# Patient Record
Sex: MALE | Race: AMERICAN INDIAN OR ALASKA NATIVE | NOT HISPANIC OR LATINO | Employment: UNEMPLOYED | URBAN - METROPOLITAN AREA
[De-identification: names, ages, dates, MRNs, and addresses within clinical notes are randomized per-mention and may not be internally consistent; named-entity substitution may affect disease eponyms.]

---

## 2017-01-09 ENCOUNTER — TELEPHONE (OUTPATIENT)
Dept: FAMILY MEDICINE | Facility: CLINIC | Age: 37
End: 2017-01-09

## 2017-01-09 NOTE — TELEPHONE ENCOUNTER
Prior Authorization needed on:  Patient would like ASAP.  Patient states he has been waiting for a week.    Medication:  gabapentin (NEURONTIN) 800 MG tablet  Dose:  Sig: Take 1 tablet (800 mg) by mouth 3 times daily    Pharmacy confirmed as   [id number] [place], [place] - [address] [place] [id number]  Phone: [phone] Fax: [phone]  : Yes    Insurance Name:  Patient states insurance is changing.  Currently [employer].  Insurance Phone: Unknown  Insurance Patient ID: Current Insurance ID: [id number].    Alternatives Suggested:  N/A    Leatha Proctor January 9, 2017 at 2:27 PM

## 2017-01-14 NOTE — TELEPHONE ENCOUNTER
Prior Authorization Retail Medication Request    Insurance ID (if provided): [id number]  Insurance Phone (if provided):     Any additional info from fax request:   BIN  [id number]  ELIDA  [facility]  [id number]

## 2017-01-17 ENCOUNTER — OFFICE VISIT (OUTPATIENT)
Dept: FAMILY MEDICINE | Facility: CLINIC | Age: 37
End: 2017-01-17

## 2017-01-17 VITALS
OXYGEN SATURATION: 97 % | DIASTOLIC BLOOD PRESSURE: 88 MMHG | HEIGHT: 71 IN | HEART RATE: 114 BPM | RESPIRATION RATE: 18 BRPM | BODY MASS INDEX: 44.1 KG/M2 | TEMPERATURE: 98 F | SYSTOLIC BLOOD PRESSURE: 134 MMHG | WEIGHT: 315 LBS

## 2017-01-17 DIAGNOSIS — M22.2X2 PATELLOFEMORAL STRESS SYNDROME OF BOTH KNEES: Primary | ICD-10-CM

## 2017-01-17 DIAGNOSIS — M22.2X1 PATELLOFEMORAL STRESS SYNDROME OF BOTH KNEES: Primary | ICD-10-CM

## 2017-01-17 ASSESSMENT — ENCOUNTER SYMPTOMS
FEVER: 0
ARTHRALGIAS: 1
SHORTNESS OF BREATH: 0
BACK PAIN: 1
HEADACHES: 0
ABDOMINAL PAIN: 0
VOMITING: 0
NAUSEA: 0
DYSURIA: 0

## 2017-01-17 NOTE — PATIENT INSTRUCTIONS
Here is the plan from today's visit    1. Patellofemoral stress syndrome of both knees  May use naproxen daily for pain relief  May use ice/heat for pain relief  Please do the exercises provided.  If no relief in 4-6 weeks, return to clinic.       Exercises for Patellofemoral Syndrome  Ice your knee before and after the exercises  1. The Clam Shell  Hold it open for 10 seconds,  repeat 10 times in the AM and 10 in the PM      2 The VMO Exercise  Sit on a hard surface, grab your less painful leg, sit up straight and raise the other leg off the floor just a few inches-don't lean back. Hold it up for 10 sec then relax  -repeat 10 times in the AM and 10 times in the PM       3 The Plank  Hold your body straight as a board for 10 sec then -repeat 10 times in the AM and 10 the  PM            Please call or return to clinic if your symptoms don't go away.    Follow up plan  Please make a clinic appointment for follow up with your primary physician Perico Weinstein in 4-6 weeks  for bilateral knee pain.    Thank you for coming to [facility] today.  Lab Testing:  **If you had lab testing today and your results are reassuring or normal they will be mailed to you or sent through [email or web address] within 7 days.   **If the lab tests need quick action we will call you with the results.  The phone number we will call with results is # [phone] (home) . If this is not the best number please call our clinic and change the number.  Medication Refills:  If you need any refills please call your pharmacy and they will contact us.   If you need to  your refill at a new pharmacy, please contact the new pharmacy directly. The new pharmacy will help you get your medications transferred faster.   Scheduling:  If you have any concerns about today's visit or wish to schedule another appointment please call our office during normal business hours [phone] (8-5:00 M-F)  If a referral was made to a [facility] and  you don't get a call from central scheduling please call [phone].  If a Mammogram was ordered for you at [facility] call [phone] to schedule or change your appointment.  If you had an XRay/CT/Ultrasound/MRI ordered the number is [phone] to schedule or change your radiology appointment.   Medical Concerns:  If you have urgent medical concerns please call [phone] at any time of the day.  If you have a medical emergency please call 444.

## 2017-01-17 NOTE — MR AVS SNAPSHOT
After Visit Summary   1/17/2017    Vaughn Bates    MRN: [id number]           Patient Information     Date Of Birth          1980        Visit Information        Provider Department      1/17/2017 4:20 PM Paula Arango MD [facility]        Today's Diagnoses     Patellofemoral stress syndrome of both knees    -  1       Care Instructions    Here is the plan from today's visit    1. Patellofemoral stress syndrome of both knees  May use naproxen daily for pain relief  May use ice/heat for pain relief  Please do the exercises provided.  If no relief in 4-6 weeks, return to clinic.       Exercises for Patellofemoral Syndrome  Ice your knee before and after the exercises  1. The Clam Shell  Hold it open for 10 seconds,  repeat 10 times in the AM and 10 in the PM      2 The VMO Exercise  Sit on a hard surface, grab your less painful leg, sit up straight and raise the other leg off the floor just a few inches-don't lean back. Hold it up for 10 sec then relax  -repeat 10 times in the AM and 10 times in the PM       3 The Plank  Hold your body straight as a board for 10 sec then -repeat 10 times in the AM and 10 the  PM            Please call or return to clinic if your symptoms don't go away.    Follow up plan  Please make a clinic appointment for follow up with your primary physician Perico Weinstein in 4-6 weeks  for bilateral knee pain.    Thank you for coming to [facility] today.  Lab Testing:  **If you had lab testing today and your results are reassuring or normal they will be mailed to you or sent through [employer] within 7 days.   **If the lab tests need quick action we will call you with the results.  The phone number we will call with results is # [phone] (home) . If this is not the best number please call our clinic and change the number.  Medication Refills:  If you need any refills please call your pharmacy and they will contact us.   If you need to  your  refill at a new pharmacy, please contact the new pharmacy directly. The new pharmacy will help you get your medications transferred faster.   Scheduling:  If you have any concerns about today's visit or wish to schedule another appointment please call our office during normal business hours [phone] (8-5:00 M-F)  If a referral was made to a [facility] and you don't get a call from central scheduling please call [phone].  If a Mammogram was ordered for you at [facility] call [phone] to schedule or change your appointment.  If you had an XRay/CT/Ultrasound/MRI ordered the number is [phone] to schedule or change your radiology appointment.   Medical Concerns:  If you have urgent medical concerns please call [phone] at any time of the day.  If you have a medical emergency please call [phone].          Follow-ups after your visit        Who to contact     Please call your clinic at [phone] to:    Ask questions about your health    Make or cancel appointments    Discuss your medicines    Learn about your test results    Speak to your doctor   If you have compliments or concerns about an experience at your clinic, or if you wish to file a complaint, please contact [facility] Patient Relations at [phone] or email us at [email or web address]         Additional Information About Your Visit        MyChart Information     [employer] is an electronic gateway that provides easy, online access to your medical records. With [employer], you can request a clinic appointment, read your test results, renew a prescription or communicate with your care team.     To sign up for [employer] visit the website at [email or web address]   You will be asked to enter the access code listed below, as well as some personal information. Please follow the directions to create your username and password.     Your access code is: [email or web address]  Expires:  "2017  5:06 PM     Your access code will  in 90 days. If you need help or a new code, please contact your [facility] or call [phone] for assistance.        Care EveryWhere ID     This is your Care EveryWhere ID. This could be used by other organizations to access your [place] medical records  [id number]        Your Vitals Were     Pulse Temperature Respirations Height BMI (Body Mass Index) Pulse Oximetry    114 98  F (36.7  C) (Oral) 18 5' 11\" (180.3 cm) 51.07 kg/m2 97%       Blood Pressure from Last 3 Encounters:   17 134/88   16 135/95   10/11/16 140/87    Weight from Last 3 Encounters:   17 366 lb (166.017 kg)   16 378 lb (171.46 kg)   10/11/16 352 lb (159.666 kg)              Today, you had the following     No orders found for display       Primary Care Provider Office Phone # Fax #    Perico Weinstein [phone]       [facility] [address] [place] [address]        Thank you!     Thank you for choosing [facility]  for your care. Our goal is always to provide you with excellent care. Hearing back from our patients is one way we can continue to improve our services. Please take a few minutes to complete the written survey that you may receive in the mail after your visit with us. Thank you!             Your Updated Medication List - Protect others around you: Learn how to safely use, store and throw away your medicines at www.disposemymeds.org.          This list is accurate as of: 17  5:06 PM.  Always use your most recent med list.                   Brand Name Dispense Instructions for use    albuterol 108 (90 BASE) MCG/ACT Inhaler    PROVENTIL HFA    1 each    Inhale 2 puffs into the lungs every 6 hours       gabapentin 800 MG tablet    NEURONTIN    90 tablet    Take 1 tablet (800 mg) by mouth 3 times daily       lidocaine 5 % Patch    LIDODERM    30 patch    Place 1 patch onto the skin " every 24 hours       naproxen 375 MG tablet    NAPROSYN    180 tablet    Take 1 tablet (375 mg) by mouth 2 times daily as needed       order for DME     2 Box    Equipment being ordered: Job stocking(compression stocking)       SUBOXONE 8-2 MG Subl sublingual tablet   Generic drug:  buprenorphine-naloxone     60 each    Place 2 tablets under the tongue daily.

## 2017-01-17 NOTE — PROGRESS NOTES
"      HPI:       Vaughn Bates is a 36 year old who presents for the following  Patient presents with:  Knee Pain: bilateral knee pain     Of and on bilateral knee pain. Has been using gabapentin, naproxen and lidocaine patch which helps with symptom relief but has not been able to get the medications due to prior auth. Has had pain for the last 2 weeks. No trauma or injury. Does not exercise. Smokes about half a pack a day. Walking, sitting, climbing stairs/getting down does not bother him.     Problem, Medication and Allergy Lists were reviewed and are current.  Patient is an established patient of this clinic.         Review of Systems:   Review of Systems   Constitutional: Negative for fever.   Respiratory: Negative for shortness of breath.    Cardiovascular: Negative for chest pain.   Gastrointestinal: Negative for nausea, vomiting and abdominal pain.   Genitourinary: Negative for dysuria.   Musculoskeletal: Positive for back pain and arthralgias.   Skin: Negative for rash.   Neurological: Negative for syncope and headaches.             Physical Exam:   Patient Vitals for the past 24 hrs:   BP Temp Temp src Pulse Resp SpO2 Height Weight   01/17/17 1630 134/88 mmHg 98  F (36.7  C) Oral 114 18 97 % 5' 11\" (180.3 cm) (!) 366 lb (166.017 kg)     Body mass index is 51.07 kg/(m^2).  Vitals were reviewed and were normal     Physical Exam   Constitutional: He appears well-developed.   Overweight     HENT:   Head: Normocephalic and atraumatic.   Eyes: Conjunctivae are normal. No scleral icterus.   Neck: Normal range of motion.   Cardiovascular: Normal rate and normal heart sounds.    Pulmonary/Chest: Effort normal and breath sounds normal.   Abdominal: Soft. Bowel sounds are normal.   Left Knee Exam     Tenderness   None    Tests   McMurrays:  Medial - Negative      Lateral - Negative  Lachman:  Anterior - Negative    Posterior - Negative  Drawer:       Anterior - Negative     Posterior - Negative  Varus:  " Negative  Valgus: Negative  Patellar Apprehension: No    Right Knee Exam     Tenderness   None    Tests   McMurrays:  Medial - Negative      Lateral - Negative  Lachman:  Anterior - Negative    Posterior - Negative  Drawer:       Anterior - Negative    Posterior - Negative  Varus:  Negative  Valgus: Negative          Results:     No investigations ordered    Assessment and Plan     1. Patellofemoral stress syndrome of both knees  Discussed exam findings with patient. No evidence of joint line tenderness, ligament laxity or injury elicited on exam. Likely pain related to overuse. Discussed RICE intervention plus exercises for Patellofemoral strengthening- hand out given. If no improvement in 4 weeks, recommend returning to clinic, will consider imaging and PT referral at that point. Informed patient that prior auth process has been started for his medications. Will route this note to his PCP as an fyi.     There are no discontinued medications.  Options for treatment and follow-up care were reviewed with the patient. Vaughn Bates  engaged in the decision making process and verbalized understanding of the options discussed and agreed with the final plan.    Paula Arango MD

## 2017-01-17 NOTE — Clinical Note
Dr. Weinstein,   Routing this note as an FYI. Patient apprehensive about the prior auth process. I informed him that the process has been started by the clinic and there is no way I can predict how long the process will take.   Lemuel, Paula

## 2017-01-17 NOTE — PROGRESS NOTES
Preceptor Attestation:   Patient seen and discussed with the resident. Assessment and plan reviewed with resident and agreed upon.   Supervising Physician:  Kamilla Kang MD  [facility]

## 2017-01-18 NOTE — TELEPHONE ENCOUNTER
Received request for additional information from [employer]. Faxed additional information to [facility] at [phone].    Argelia Malik CMA

## 2017-01-26 DIAGNOSIS — R52 PAIN IN SURGICAL SCAR: ICD-10-CM

## 2017-01-26 DIAGNOSIS — L90.5 PAIN IN SURGICAL SCAR: ICD-10-CM

## 2017-01-26 DIAGNOSIS — G89.29 OTHER CHRONIC PAIN: Primary | ICD-10-CM

## 2017-01-26 NOTE — TELEPHONE ENCOUNTER
Request for medication refill:    Date of last visit at clinic: 01/17/2017    Please complete refill if appropriate and CLOSE ENCOUNTER.    Closing the encounter signifies the refill is complete.    If refill has been denied, please complete the smart phrase .smirefuse and route it to the [facility] RN TRIAGE pool to inform the patient and the pharmacy.    Yaquelin Grewal, CMA

## 2017-01-27 DIAGNOSIS — G89.29 OTHER CHRONIC PAIN: Primary | ICD-10-CM

## 2017-01-27 DIAGNOSIS — R52 PAIN IN SURGICAL SCAR: ICD-10-CM

## 2017-01-27 DIAGNOSIS — L90.5 PAIN IN SURGICAL SCAR: ICD-10-CM

## 2017-01-27 RX ORDER — GABAPENTIN 800 MG/1
800 TABLET ORAL 3 TIMES DAILY
Qty: 90 TABLET | Refills: 5 | Status: SHIPPED | OUTPATIENT
Start: 2017-01-27 | End: 2017-06-02

## 2017-01-27 RX ORDER — GABAPENTIN 800 MG/1
800 TABLET ORAL 3 TIMES DAILY
Qty: 90 TABLET | Refills: 5 | Status: SHIPPED | OUTPATIENT
Start: 2017-01-27 | End: 2017-01-27

## 2017-01-27 NOTE — TELEPHONE ENCOUNTER
Request for medication refill:    Date of last visit at clinic: 1-17-17    Please complete refill if appropriate and CLOSE ENCOUNTER.    Closing the encounter signifies the refill is complete.    If refill has been denied, please complete the smart phrase .smirefuse and route it to the [facility] RN TRIAGE pool to inform the patient and the pharmacy.    Ann Keller

## 2017-02-01 ENCOUNTER — TELEPHONE (OUTPATIENT)
Dept: FAMILY MEDICINE | Facility: CLINIC | Age: 37
End: 2017-02-01

## 2017-02-01 NOTE — TELEPHONE ENCOUNTER
[facility] phone call message- patient requesting a refill:    Full Medication Name: gabapentin (NEURONTIN) 800 MG tablet    Dose:     Pharmacy confirmed as   [facility] [place], [place] - [address]  [facility] [address]  Phone: [phone] Fax: [phone]    [facility] [address] [place] [facility] [place], [place] - [address]  [address] [address]  Phone: [phone] Fax: [phone]  : Yes    Additional Comments:pt requesting prior authorization this rx    OK to leave a message on voice mail? Yes    Primary language: English      needed? No    Call taken on February 1, 2017 at 3:35 PM by Sal Merchant

## 2017-02-03 NOTE — TELEPHONE ENCOUNTER
PA already initated. Please see telephone encounter dated 1/9/2017 for more info regarding PA.    Argelia Malik, CMA

## 2017-02-16 NOTE — TELEPHONE ENCOUNTER
Prior Authorization: Approved    Approved as of: 02/14/2017    Pharmacy notified. Routing to MD Yaquelin Grewal February 16, 2017 at 8:57 AM

## 2017-05-26 ENCOUNTER — TELEPHONE (OUTPATIENT)
Dept: FAMILY MEDICINE | Facility: CLINIC | Age: 37
End: 2017-05-26

## 2017-05-26 NOTE — TELEPHONE ENCOUNTER
PA started 05/26/2017 gabapentin (NEURONTIN) 800 MG tablet    covermymeds Key: [id number]    [facility] pharmacy Phone [phone] fax [phone]    Yaquelin Grewal CMA

## 2017-05-26 NOTE — TELEPHONE ENCOUNTER
Soni from [facility] is calling to let PCP know that if the prescription is changed from tablets to capsules, the medication would be covered.     Hermelinda Naylor, CMA

## 2017-05-31 NOTE — TELEPHONE ENCOUNTER
Reviewed. Will route to prescribing physician for confirmation and change in prescription from tablet to capsules). I last saw this pt on 4/19/16.    Perico Diaz M.D  [facility] Resident

## 2017-06-02 DIAGNOSIS — G89.4 CHRONIC PAIN SYNDROME: Primary | ICD-10-CM

## 2017-06-02 RX ORDER — GABAPENTIN 400 MG/1
800 CAPSULE ORAL 3 TIMES DAILY
Qty: 180 CAPSULE | Refills: 3 | Status: SHIPPED | OUTPATIENT
Start: 2017-06-02 | End: 2017-07-19

## 2017-07-19 ENCOUNTER — OFFICE VISIT (OUTPATIENT)
Dept: FAMILY MEDICINE | Facility: CLINIC | Age: 37
End: 2017-07-19

## 2017-07-19 VITALS
RESPIRATION RATE: 16 BRPM | BODY MASS INDEX: 46.53 KG/M2 | TEMPERATURE: 97.8 F | OXYGEN SATURATION: 99 % | WEIGHT: 315 LBS | DIASTOLIC BLOOD PRESSURE: 71 MMHG | HEART RATE: 88 BPM | SYSTOLIC BLOOD PRESSURE: 110 MMHG

## 2017-07-19 DIAGNOSIS — G89.4 CHRONIC PAIN SYNDROME: ICD-10-CM

## 2017-07-19 DIAGNOSIS — G56.21 ULNAR NEUROPATHY AT ELBOW, RIGHT: Primary | ICD-10-CM

## 2017-07-19 RX ORDER — GABAPENTIN 400 MG/1
800 CAPSULE ORAL 3 TIMES DAILY
Qty: 180 CAPSULE | Refills: 11 | Status: SHIPPED | OUTPATIENT
Start: 2017-07-19 | End: 2018-01-23

## 2017-07-19 NOTE — PATIENT INSTRUCTIONS
Here is the plan from today's visit    1. Ulnar neuropathy at elbow, right  2. Lesion of right ulnar nerve  You will get a call about the following services.    - OCCUPATIONAL THERAPY REFERRAL - INTERNAL  - [facility] ([facility]); Future    3. Chronic pain syndrome  refill  - gabapentin (NEURONTIN) 400 MG capsule; Take 2 capsules (800 mg) by mouth 3 times daily  Dispense: 180 capsule; Refill: 11      Please call or return to clinic if your symptoms don't go away.    Follow up plan  return in 1 month    Thank you for coming to [facility] today.  Lab Testing:  **If you had lab testing today and your results are reassuring or normal they will be mailed to you or sent through [employer] within 7 days.   **If the lab tests need quick action we will call you with the results.  The phone number we will call with results is # [phone] (home) . If this is not the best number please call our clinic and change the number.  Medication Refills:  If you need any refills please call your pharmacy and they will contact us.   If you need to  your refill at a new pharmacy, please contact the new pharmacy directly. The new pharmacy will help you get your medications transferred faster.   Scheduling:  If you have any concerns about today's visit or wish to schedule another appointment please call our office during normal business hours [phone] (8-5:00 M-F)  If a referral was made to a [facility] and you don't get a call from central scheduling please call [phone].  If a Mammogram was ordered for you at [facility] call [phone] to schedule or change your appointment.  If you had an XRay/CT/Ultrasound/MRI ordered the number is [phone] to schedule or change your radiology appointment.   Medical Concerns:  If you have urgent medical concerns please call [phone] at any time of the day.

## 2017-07-19 NOTE — PROGRESS NOTES
Preceptor Attestation:   Patient seen and discussed with the resident.   Assessment and plan reviewed with resident and agreed upon.   Supervising Physician:  Pedro Bess MD  [facility]

## 2017-07-19 NOTE — PROGRESS NOTES
HPI:       Vaughn Bates is a 36 year old who presents for the following  Patient presents with:  Musculoskeletal Problem: pain in wrist and hand     Pins and needs in the hands.  He is having trouble feeling palmar medial aspect of his right hand.  He has been dropping things without noticing.  He thinks it may be improving but is not sure.  asdfna pins and needles in the left.  He will wake up with it this way but it goes away quickly.     Tried carpal tunnel wrist braces and thinks that it has been helping alittle        Problem, Medication and Allergy Lists were reviewed and are current.  Patient is an established patient of this clinic.         Review of Systems:   Review of Systems   Constitutional: Negative for chills and fever.   Respiratory: Negative for shortness of breath.    Cardiovascular: Negative for chest pain.   Gastrointestinal: Negative for constipation, diarrhea, nausea and vomiting.   Neurological: Positive for numbness (right hand). Negative for dizziness, light-headedness and headaches.   Psychiatric/Behavioral: Negative for dysphoric mood.             Physical Exam:   Patient Vitals for the past 24 hrs:   BP Temp Temp src Pulse Resp SpO2 Weight   07/19/17 0956 110/71 97.8  F (36.6  C) Oral 88 16 99 % (!) 333 lb 9.6 oz (151.3 kg)     Body mass index is 46.53 kg/(m^2).  Vitals were reviewed and were normal     Physical Exam   Constitutional: He is oriented to person, place, and time. He appears well-developed.   HENT:   Head: Normocephalic and atraumatic.   Eyes: Conjunctivae are normal.   Pulmonary/Chest: No respiratory distress.   Neurological: He is alert and oriented to person, place, and time.   Numbness to pinpoint on right 5th, 4th, and 3rd fingers. Good sensation and movement on left      Skin: Skin is warm and dry. No erythema.   Psychiatric: He has a normal mood and affect. His behavior is normal. Thought content normal.         Results:     No investigations ordered  today    Assessment and Plan     1. Ulnar neuropathy at elbow, right  2. Lesion of right ulnar nerve  Pt has carpal tunnel but it appears to be coming farther up the arm. It does not appear to be at the elbow. An EMG would help elucidate the source of the impingement of the ulnar nerve  - OCCUPATIONAL THERAPY REFERRAL - INTERNAL  - EM[facility] ([facility]); Future    3. Chronic pain syndrome  MNPMP taken care of.   - gabapentin (NEURONTIN) 400 MG capsule; Take 2 capsules (800 mg) by mouth 3 times daily  Dispense: 180 capsule; Refill: 11  There are no discontinued medications.  Options for treatment and follow-up care were reviewed with the patient. Vaughn Bates  engaged in the decision making process and verbalized understanding of the options discussed and agreed with the final plan.    Stanley Minaya, DO

## 2017-07-19 NOTE — MR AVS SNAPSHOT
After Visit Summary   7/19/2017    Vaughn Bates    MRN: [id number]           Patient Information     Date Of Birth          1980        Visit Information        Provider Department      7/19/2017 9:40 AM Stanley Minaya DO [facility]        Today's Diagnoses     Ulnar neuropathy at elbow, right    -  1    Lesion of right ulnar nerve        Chronic pain syndrome          Care Instructions    Here is the plan from today's visit    1. Ulnar neuropathy at elbow, right  2. Lesion of right ulnar nerve  You will get a call about the following services.    - OCCUPATIONAL THERAPY REFERRAL - INTERNAL  - [facility] ([facility]); Future    3. Chronic pain syndrome  refill  - gabapentin (NEURONTIN) 400 MG capsule; Take 2 capsules (800 mg) by mouth 3 times daily  Dispense: 180 capsule; Refill: 11      Please call or return to clinic if your symptoms don't go away.    Follow up plan  return in 1 month    Thank you for coming to [facility] today.  Lab Testing:  **If you had lab testing today and your results are reassuring or normal they will be mailed to you or sent through iLive within 7 days.   **If the lab tests need quick action we will call you with the results.  The phone number we will call with results is # [phone] (home) . If this is not the best number please call our clinic and change the number.  Medication Refills:  If you need any refills please call your pharmacy and they will contact us.   If you need to  your refill at a new pharmacy, please contact the new pharmacy directly. The new pharmacy will help you get your medications transferred faster.   Scheduling:  If you have any concerns about today's visit or wish to schedule another appointment please call our office during normal business hours [phone] (8-5:00 M-F)  If a referral was made to a [facility] and you don't get a call from central scheduling please call  "[phone].  If a Mammogram was ordered for you at [facility] call [phone] to schedule or change your appointment.  If you had an XRay/CT/Ultrasound/MRI ordered the number is [phone] to schedule or change your radiology appointment.   Medical Concerns:  If you have urgent medical concerns please call [phone] at any time of the day.            Follow-ups after your visit        Additional Services     OCCUPATIONAL THERAPY REFERRAL - INTERNAL       *This therapy referral will be filtered to a centralized scheduling office at [facility] and the patient will receive a call to schedule an appointment at a [facility] location most convenient for them. *     [facility] provides Occupational Therapy evaluation and treatment and many specialty services across the [place] system.  If requesting a specialty program, please choose from the list below.    If you have not heard from the scheduling office within 2 business days, please call [phone] for all locations, with the exception of [place], please call [phone].     Treatment: Evaluation & Treatment  Special Instructions/Modalities: ulnar neuropathy right   Special Programs:    Please be aware that coverage of these services is subject to the terms and limitations of your health insurance plan.  Call member services at your health plan with any benefit or coverage questions.      **Note to Provider:  If you are referring outside of [facility] for the therapy appointment, please list the name of the location in the \"special instructions\" above, print the referral and give to the patient to schedule the appointment.                  Future tests that were ordered for you today     Open Future Orders        Priority Expected Expires Ordered    [facility] ([facility]) Routine  7/14/2018 7/19/2017            Who to contact     Please call your clinic at [phone] to:    Ask questions about your " health    Make or cancel appointments    Discuss your medicines    Learn about your test results    Speak to your doctor   If you have compliments or concerns about an experience at your clinic, or if you wish to file a complaint, please contact [facility] Patient Relations at [phone] or email us at [email or web address]         Additional Information About Your Visit        [employer] Information     InSupplyt is an electronic gateway that provides easy, online access to your medical records. With [employer], you can request a clinic appointment, read your test results, renew a prescription or communicate with your care team.     To sign up for [employer] visit the website at [email or web address]   You will be asked to enter the access code listed below, as well as some personal information. Please follow the directions to create your username and password.     Your access code is: [email or web address]  Expires: 10/17/2017 10:20 AM     Your access code will  in 90 days. If you need help or a new code, please contact your [facility] or call [phone] for assistance.        Care EveryWhere ID     This is your Care EveryWhere ID. This could be used by other organizations to access your [place] medical records  [id number]        Your Vitals Were     Pulse Temperature Respirations Pulse Oximetry BMI (Body Mass Index)       88 97.8  F (36.6  C) (Oral) 16 99% 46.53 kg/m2        Blood Pressure from Last 3 Encounters:   17 110/71   17 134/88   16 (!) 135/95    Weight from Last 3 Encounters:   17 (!) 333 lb 9.6 oz (151.3 kg)   17 (!) 366 lb (166 kg)   16 (!) 378 lb (171.5 kg)              We Performed the Following     OCCUPATIONAL THERAPY REFERRAL - INTERNAL          Where to get your medicines      These medications were sent to [facility] [address] [place], [place] - [address]  [address],  [facility] [address]     Phone:  [phone]     gabapentin 400 MG capsule          Primary Care Provider Office Phone # Fax #    Eber Joel [phone]       [facility] [address]   [facility] [address]        Equal Access to Services     NARINDER PAINTER : Hadii aad ku hadasho Soomaali, waaxda luqadaha, qaybta kaalmada adeegyada, waxay willin hayaan nicole spainrimmakristen baum. So [facility] [phone].    ATENCIÓN: Si habla español, tiene a beltran disposición servicios gratuitos de asistencia lingüística. Alexandra al [phone].    We comply with applicable federal civil rights laws and [place] laws. We do not discriminate on the basis of race, color, national origin, age, disability sex, sexual orientation or gender identity.            Thank you!     Thank you for choosing [facility]  for your care. Our goal is always to provide you with excellent care. Hearing back from our patients is one way we can continue to improve our services. Please take a few minutes to complete the written survey that you may receive in the mail after your visit with us. Thank you!             Your Updated Medication List - Protect others around you: Learn how to safely use, store and throw away your medicines at www.disposemymeds.org.          This list is accurate as of: 7/19/17 10:20 AM.  Always use your most recent med list.                   Brand Name Dispense Instructions for use Diagnosis    albuterol 108 (90 BASE) MCG/ACT Inhaler    PROVENTIL HFA    1 each    Inhale 2 puffs into the lungs every 6 hours    Intermittent asthma, uncomplicated       gabapentin 400 MG capsule    NEURONTIN    180 capsule    Take 2 capsules (800 mg) by mouth 3 times daily    Chronic pain syndrome       lidocaine 5 % Patch    LIDODERM    30 patch    Place 1 patch onto the skin every 24 hours    Other chronic pain, Pain in surgical scar       naproxen 375 MG tablet    NAPROSYN    180 tablet     Take 1 tablet (375 mg) by mouth 2 times daily as needed    Other chronic pain, Pain in surgical scar       order for DME     2 Box    Equipment being ordered: Job stocking(compression stocking)    Pedal edema       SUBOXONE 8-2 MG Subl sublingual tablet   Generic drug:  buprenorphine-naloxone     60 each    Place 2 tablets under the tongue daily.    Other chronic pain

## 2017-07-24 ASSESSMENT — ENCOUNTER SYMPTOMS
LIGHT-HEADEDNESS: 0
DIARRHEA: 0
CONSTIPATION: 0
VOMITING: 0
SHORTNESS OF BREATH: 0
HEADACHES: 0
CHILLS: 0
DIZZINESS: 0
NUMBNESS: 1
NAUSEA: 0
DYSPHORIC MOOD: 0
FEVER: 0

## 2017-09-21 ENCOUNTER — TRANSFERRED RECORDS (OUTPATIENT)
Dept: HEALTH INFORMATION MANAGEMENT | Facility: CLINIC | Age: 37
End: 2017-09-21

## 2018-01-23 ENCOUNTER — OFFICE VISIT (OUTPATIENT)
Dept: FAMILY MEDICINE | Facility: CLINIC | Age: 38
End: 2018-01-23
Payer: COMMERCIAL

## 2018-01-23 ENCOUNTER — TELEPHONE (OUTPATIENT)
Dept: FAMILY MEDICINE | Facility: CLINIC | Age: 38
End: 2018-01-23

## 2018-01-23 VITALS
SYSTOLIC BLOOD PRESSURE: 147 MMHG | HEART RATE: 79 BPM | TEMPERATURE: 97.8 F | WEIGHT: 315 LBS | DIASTOLIC BLOOD PRESSURE: 100 MMHG | OXYGEN SATURATION: 95 % | BODY MASS INDEX: 44.49 KG/M2 | RESPIRATION RATE: 18 BRPM

## 2018-01-23 DIAGNOSIS — R20.8 ALLODYNIA: ICD-10-CM

## 2018-01-23 DIAGNOSIS — M54.50 CHRONIC BILATERAL LOW BACK PAIN WITHOUT SCIATICA: ICD-10-CM

## 2018-01-23 DIAGNOSIS — J45.20 INTERMITTENT ASTHMA, UNCOMPLICATED: ICD-10-CM

## 2018-01-23 DIAGNOSIS — R52 PAIN IN SURGICAL SCAR: ICD-10-CM

## 2018-01-23 DIAGNOSIS — G89.29 CHRONIC BILATERAL LOW BACK PAIN WITHOUT SCIATICA: ICD-10-CM

## 2018-01-23 DIAGNOSIS — G89.29 OTHER CHRONIC PAIN: ICD-10-CM

## 2018-01-23 DIAGNOSIS — G89.4 CHRONIC PAIN SYNDROME: Primary | ICD-10-CM

## 2018-01-23 DIAGNOSIS — L90.5 PAIN IN SURGICAL SCAR: ICD-10-CM

## 2018-01-23 DIAGNOSIS — G89.4 CHRONIC PAIN SYNDROME: ICD-10-CM

## 2018-01-23 DIAGNOSIS — G89.29 OTHER CHRONIC PAIN: Primary | ICD-10-CM

## 2018-01-23 RX ORDER — LIDOCAINE 4 G/G
1 PATCH TOPICAL EVERY 24 HOURS
Qty: 30 PATCH | Refills: 0 | Status: SHIPPED | OUTPATIENT
Start: 2018-01-23 | End: 2019-07-30

## 2018-01-23 RX ORDER — METHADONE HYDROCHLORIDE 10 MG/5ML
85 SOLUTION ORAL DAILY
Refills: 0 | COMMUNITY
Start: 2018-01-23

## 2018-01-23 RX ORDER — ALBUTEROL SULFATE 90 UG/1
2 AEROSOL, METERED RESPIRATORY (INHALATION) EVERY 6 HOURS
Qty: 1 INHALER | Refills: 1 | Status: SHIPPED | OUTPATIENT
Start: 2018-01-23 | End: 2019-07-30

## 2018-01-23 RX ORDER — GABAPENTIN 400 MG/1
800 CAPSULE ORAL 3 TIMES DAILY
Qty: 180 CAPSULE | Refills: 0 | Status: SHIPPED | OUTPATIENT
Start: 2018-01-23 | End: 2018-01-29

## 2018-01-23 RX ORDER — LIDOCAINE 50 MG/G
1 PATCH TOPICAL EVERY 24 HOURS
Qty: 30 PATCH | Refills: 0 | Status: SHIPPED | OUTPATIENT
Start: 2018-01-23 | End: 2018-01-23

## 2018-01-23 RX ORDER — GABAPENTIN 400 MG/1
800 CAPSULE ORAL 3 TIMES DAILY
Qty: 180 CAPSULE | Refills: 11 | Status: CANCELLED | OUTPATIENT
Start: 2018-01-23

## 2018-01-23 NOTE — TELEPHONE ENCOUNTER
No PA will be completed.  I will send prescription for Lidoderm patch 4% over the counter that he may purchase is insurance will not cover.

## 2018-01-23 NOTE — TELEPHONE ENCOUNTER
Request for medication refill:    Date of last visit at clinic: 7-19-17    Please complete refill if appropriate and CLOSE ENCOUNTER.    Closing the encounter signifies the refill is complete.    If refill has been denied, please complete the smart phrase .smirefuse and route it to the [facility] RN TRIAGE pool to inform the patient and the pharmacy.    Ann Keller, CMA

## 2018-01-23 NOTE — MR AVS SNAPSHOT
After Visit Summary   1/23/2018    Vaughn Bates    MRN: [id number]           Patient Information     Date Of Birth          1980        Visit Information        Provider Department      1/23/2018 10:40 AM Pedro Bess MD [facility]        Today's Diagnoses     Chronic pain syndrome    -  1    Other chronic pain        Pain in surgical scar        Allodynia        Intermittent asthma, uncomplicated          Care Instructions      My Asthma Action Plan  Name: Vaughn Bates  YOB: 1980  Date: 1/23/2018   My doctor: Eber Joel   My clinic:   [place] [address] [address] [place] [address],  [address]  [place] [address]  [phone]    My Asthma Severity: intermittent Avoid your asthma triggers: upper respiratory infections      GREEN ZONE   Good Control    I feel good    No cough or wheeze    Can work, sleep and play without asthma symptoms       Take your asthma control medicine every day.  Take the medications listed below daily.        1. If exercise triggers your asthma, take your rescue medication (2 puffs of albuterol, Ventolin/Pro-Air) 15 minutes before exercise or sports, and during exercise if you have asthma symptoms.  2. Spacer to use with inhaler: If you have a spacer, make sure to use it with your inhaler.              YELLOW ZONE Getting Worse  I have ANY of these:    I do not feel good    Cough or wheeze    Chest feels tight    Wake up at night   1. Keep taking your Green Zone medications.  2. Start taking your rescue medicine (1-2 puffs of albuterol - Ventolin/Pro-Air) every 4-6 hours as needed.  3. If symptoms are not controlled with above, can take 2 puffs every 20 minutes for up to 1 hour, then continue every 4 hours if needed.   4. If you do not return to the Green Zone in 12-24 hours or you get worse, call the clinic.         RED ZONE Medical Alert - Get Help  I have ANY of these:    I feel awful    Medicine is not  helping    Breathing getting harder    Trouble walking or talking    Nose opens wide to breathe       1. Take your rescue medicine NOW (6-8 puffs of albuterol - Ventolin/Pro-Air) for every 20 minutes for up to 1 hour.    2. Call your doctor NOW.  3. If you are still in the Red Zone after 20 minutes and you have not reached your doctor:    Take your rescue medicine again (6-8 puffs of albuterol - Ventolin/Pro-Air) and    Call 911 or go to the emergency room right away    See your regular doctor within 1 weeks of an Emergency Room or Urgent Care visit for follow-up treatment.            Electronically signed by: Wilber Flor    Annual Reminders:  Meet with Asthma Educator,  Flu Shot in the Fall, Pneumonia Shot  Pharmacy:    [facility] [place], [place] - [address] [address] [place], [place] - [address] [id number] - [place], [place] - [address] [place] [address] nausea      Here is the plan from today's visit    1. Chronic pain syndrome  - gabapentin (NEURONTIN) 400 MG capsule; Take 2 capsules (800 mg) by mouth 3 times daily  Dispense: 180 capsule; Refill: 0    2. Other chronic pain  - naproxen (NAPROSYN) 375 MG tablet; Take 1 tablet (375 mg) by mouth 2 times daily as needed  Dispense: 180 tablet; Refill: 0  - lidocaine (LIDODERM) 5 % Patch; Place 1 patch onto the skin every 24 hours  Dispense: 30 patch; Refill: 0    3. Pain in surgical scar  - naproxen (NAPROSYN) 375 MG tablet; Take 1 tablet (375 mg) by mouth 2 times daily as needed  Dispense: 180 tablet; Refill: 0  - lidocaine (LIDODERM) 5 % Patch; Place 1 patch onto the skin every 24 hours  Dispense: 30 patch; Refill: 0    4. Allodynia    5. Intermittent asthma, uncomplicated  - albuterol (PROVENTIL HFA) 108 (90 BASE) MCG/ACT Inhaler; Inhale 2 puffs into the lungs every 6 hours  Dispense: 1 Inhaler; Refill: 1  - Asthma Action Plan (AAP)      Follow up plan  Follow up in 2 months for refills either here or [place]     Thank  you for coming to [facility] today.  Lab Testing:  **If you had lab testing today and your results are reassuring or normal they will be mailed to you or sent through Classical Connection within 7 days.   **If the lab tests need quick action we will call you with the results.  The phone number we will call with results is # [phone] (home) . If this is not the best number please call our clinic and change the number.  Medication Refills:  If you need any refills please call your pharmacy and they will contact us.   If you need to  your refill at a new pharmacy, please contact the new pharmacy directly. The new pharmacy will help you get your medications transferred faster.   Scheduling:  If you have any concerns about today's visit or wish to schedule another appointment please call our office during normal business hours [phone] (8-5:00 M-F)  If a referral was made to a [facility] and you don't get a call from central scheduling please call [phone].  If a Mammogram was ordered for you at [facility] call [phone] to schedule or change your appointment.  If you had an XRay/CT/Ultrasound/MRI ordered the number is [phone] to schedule or change your radiology appointment.   Medical Concerns:  If you have urgent medical concerns please call [phone] at any time of the day.            Follow-ups after your visit        Who to contact     Please call your clinic at [phone] to:    Ask questions about your health    Make or cancel appointments    Discuss your medicines    Learn about your test results    Speak to your doctor   If you have compliments or concerns about an experience at your clinic, or if you wish to file a complaint, please contact [facility] Patient Relations at [phone] or email us at [email or web address]         Additional Information About Your Visit        Classical Connection Information     Classical Connection is an  electronic gateway that provides easy, online access to your medical records. With [employer], you can request a clinic appointment, read your test results, renew a prescription or communicate with your care team.     To sign up for [employer] visit the website at [email or web address]   You will be asked to enter the access code listed below, as well as some personal information. Please follow the directions to create your username and password.     Your access code is: [email or web address]  Expires: 2018 11:39 AM     Your access code will  in 90 days. If you need help or a new code, please contact your [facility] or call [phone] for assistance.        Care EveryWhere ID     This is your Care EveryWhere ID. This could be used by other organizations to access your [place] medical records  [id number]        Your Vitals Were     Pulse Temperature Respirations Pulse Oximetry BMI (Body Mass Index)       79 97.8  F (36.6  C) (Oral) 18 95% 44.49 kg/m2        Blood Pressure from Last 3 Encounters:   18 (!) 147/100   17 110/71   17 134/88    Weight from Last 3 Encounters:   18 (!) 319 lb (144.7 kg)   17 (!) 333 lb 9.6 oz (151.3 kg)   17 (!) 366 lb (166 kg)              We Performed the Following     Asthma Action Plan (AAP)          Today's Medication Changes          These changes are accurate as of: 18 11:39 AM.  If you have any questions, ask your nurse or doctor.               Stop taking these medicines if you haven't already. Please contact your care team if you have questions.     SUBOXONE 8-2 MG Subl sublingual tablet   Generic drug:  buprenorphine-naloxone   Stopped by:  Pedro Bess MD                Where to get your medicines      These medications were sent to [facility] [place], [place] - [address] [address]     Phone:  [phone]     albuterol 108 (90 BASE) MCG/ACT Inhaler     gabapentin 400 MG capsule    lidocaine 5 % Patch    naproxen 375 MG tablet                Primary Care Provider Office Phone # Fax #    Eber Elver Joel [phone]       [facility] [address]   [facility] [address]        Equal Access to Services     PRANAYRONI INOCENCIO : Hadii aad ku hadasho Soomaali, waaxda luqadaha, qaybta kaalmada adeegyada, waxjohanna willin haytrenan adejerson cooper va baum. So [facility] [phone].    ATENCIÓN: Si habla español, tiene a beltran disposición servicios gratuitos de asistencia lingüística. Llame al [phone].    We comply with applicable federal civil rights laws and [place] laws. We do not discriminate on the basis of race, color, national origin, age, disability, sex, sexual orientation, or gender identity.            Thank you!     Thank you for choosing [facility]  for your care. Our goal is always to provide you with excellent care. Hearing back from our patients is one way we can continue to improve our services. Please take a few minutes to complete the written survey that you may receive in the mail after your visit with us. Thank you!             Your Updated Medication List - Protect others around you: Learn how to safely use, store and throw away your medicines at www.disposemymeds.org.          This list is accurate as of: 1/23/18 11:39 AM.  Always use your most recent med list.                   Brand Name Dispense Instructions for use Diagnosis    albuterol 108 (90 BASE) MCG/ACT Inhaler    PROVENTIL HFA    1 Inhaler    Inhale 2 puffs into the lungs every 6 hours    Intermittent asthma, uncomplicated       gabapentin 400 MG capsule    NEURONTIN    180 capsule    Take 2 capsules (800 mg) by mouth 3 times daily    Chronic pain syndrome       lidocaine 5 % Patch    LIDODERM    30 patch    Place 1 patch onto the skin every 24 hours    Other chronic pain, Pain in surgical scar       methadone HCl 10 MG/5ML Soln       Take 42.5 mLs (85 mg) by mouth daily        naproxen 375 MG tablet    NAPROSYN    180 tablet    Take 1 tablet (375 mg) by mouth 2 times daily as needed    Other chronic pain, Pain in surgical scar

## 2018-01-23 NOTE — TELEPHONE ENCOUNTER
PRIOR AUTHORIZATION FOR   Drug: Lidocaine 5% Pacthes  Insurance: [facility]  ID Number: [id number]  BIN Number: [id number]  PCN Number: [place]  Group Number: [id number]  Phone Number: [phone]    Please notify pharmacy if Prior Auth is approved or denied      Thank You  Aspen Fischer CPhT, [place] [facility] Technician  [phone]  [email or web address]

## 2018-01-23 NOTE — PROGRESS NOTES
Ran out of gabapentin.    Back pain    Naproxen as needed  Used once a week.    Need a refill    Lidoderm patch:  Used 1-2 x every few weeks.        SUboxone: 16 mg daily.  Taken last in August.    Managed at another clinic.  Pharmacy records are not accurate?  All prescribed within [place].    Was taken off the treatment program in [facility].    relpsed and then hospitalized.      Methadone:  Daily dose or 85 mg.  Dose received today.

## 2018-01-23 NOTE — PATIENT INSTRUCTIONS
My Asthma Action Plan  Name: Vaughn Bates  YOB: 1980  Date: 1/23/2018   My doctor: Eber Joel   My clinic:   [facility]  [facility] [address],  [address]  [place] [address]  [phone]    My Asthma Severity: intermittent Avoid your asthma triggers: upper respiratory infections      GREEN ZONE   Good Control    I feel good    No cough or wheeze    Can work, sleep and play without asthma symptoms       Take your asthma control medicine every day.  Take the medications listed below daily.        1. If exercise triggers your asthma, take your rescue medication (2 puffs of albuterol, Ventolin/Pro-Air) 15 minutes before exercise or sports, and during exercise if you have asthma symptoms.  2. Spacer to use with inhaler: If you have a spacer, make sure to use it with your inhaler.              YELLOW ZONE Getting Worse  I have ANY of these:    I do not feel good    Cough or wheeze    Chest feels tight    Wake up at night   1. Keep taking your Green Zone medications.  2. Start taking your rescue medicine (1-2 puffs of albuterol - Ventolin/Pro-Air) every 4-6 hours as needed.  3. If symptoms are not controlled with above, can take 2 puffs every 20 minutes for up to 1 hour, then continue every 4 hours if needed.   4. If you do not return to the Green Zone in 12-24 hours or you get worse, call the clinic.         RED ZONE Medical Alert - Get Help  I have ANY of these:    I feel awful    Medicine is not helping    Breathing getting harder    Trouble walking or talking    Nose opens wide to breathe       1. Take your rescue medicine NOW (6-8 puffs of albuterol - Ventolin/Pro-Air) for every 20 minutes for up to 1 hour.    2. Call your doctor NOW.  3. If you are still in the Red Zone after 20 minutes and you have not reached your doctor:    Take your rescue medicine again (6-8 puffs of albuterol - Ventolin/Pro-Air) and    Call 911 or go to the emergency room right away    See  your regular doctor within 1 weeks of an Emergency Room or Urgent Care visit for follow-up treatment.            Electronically signed by: Wliber Flor    Annual Reminders:  Meet with Asthma Educator,  Flu Shot in the Fall, Pneumonia Shot  Pharmacy:    [facility] [place], [place] - [address]  [facility] [address] [place], [place] - [address] [facility]  [id number] - [place], [place] - [address] nausea      Here is the plan from today's visit    1. Chronic pain syndrome  - gabapentin (NEURONTIN) 400 MG capsule; Take 2 capsules (800 mg) by mouth 3 times daily  Dispense: 180 capsule; Refill: 0    2. Other chronic pain  - naproxen (NAPROSYN) 375 MG tablet; Take 1 tablet (375 mg) by mouth 2 times daily as needed  Dispense: 180 tablet; Refill: 0  - lidocaine (LIDODERM) 5 % Patch; Place 1 patch onto the skin every 24 hours  Dispense: 30 patch; Refill: 0    3. Pain in surgical scar  - naproxen (NAPROSYN) 375 MG tablet; Take 1 tablet (375 mg) by mouth 2 times daily as needed  Dispense: 180 tablet; Refill: 0  - lidocaine (LIDODERM) 5 % Patch; Place 1 patch onto the skin every 24 hours  Dispense: 30 patch; Refill: 0    4. Allodynia    5. Intermittent asthma, uncomplicated  - albuterol (PROVENTIL HFA) 108 (90 BASE) MCG/ACT Inhaler; Inhale 2 puffs into the lungs every 6 hours  Dispense: 1 Inhaler; Refill: 1  - Asthma Action Plan (AAP)      Follow up plan  Follow up in 2 months for refills either here or [facility]     Thank you for coming to [facility] today.  Lab Testing:  **If you had lab testing today and your results are reassuring or normal they will be mailed to you or sent through [employer] within 7 days.   **If the lab tests need quick action we will call you with the results.  The phone number we will call with results is # [phone] (home) . If this is not the best number please call our clinic and change the number.  Medication Refills:  If you need any refills please call your  pharmacy and they will contact us.   If you need to  your refill at a new pharmacy, please contact the new pharmacy directly. The new pharmacy will help you get your medications transferred faster.   Scheduling:  If you have any concerns about today's visit or wish to schedule another appointment please call our office during normal business hours [phone] (8-5:00 M-F)  If a referral was made to a [facility] and you don't get a call from central scheduling please call [phone].  If a Mammogram was ordered for you at [facility] call [phone] to schedule or change your appointment.  If you had an XRay/CT/Ultrasound/MRI ordered the number is [phone] to schedule or change your radiology appointment.   Medical Concerns:  If you have urgent medical concerns please call [phone] at any time of the day.

## 2018-01-24 ASSESSMENT — ASTHMA QUESTIONNAIRES: ACT_TOTALSCORE: 21

## 2018-01-24 NOTE — PROGRESS NOTES
"       HPI       Vaughn Bates is a 37 year old  male with history of chronic pain and opioid dependence who presents for medication refills and reconciliation. Per patient's report, he is currently being treated with 85 mg Methadone at a specialized treatment center in [facility]. Of note, he was previously treated with Suboxone 16 mg QD at the same center and was released from the program. He shares that he overdosed a few days later. This was around the end of August. Since restarting the program, he has been on Methadone daily. He participates in talk therapy as well.     Vaughn endorses unchanged back pain worsened with strenuous activity and heavy lifting. He reports good pain relief with the gabapentin, naproxen and lidoderm patches. He uses the patches 1-2x/month. He only has a few left and is wondering if these could be refilled along with all of his other medications (with the exception of Methadone).     Vaughn is moving to [place] for 1-2 months to help his niece pack up his recently  sisters' home. He will be driving back once a day to get his methadone from the treatment center in [facility]. He is otherwise doing \"fine\". He has been doing well on daily methadone. His asthma is fairly well controlled. Triggers include smoking tobacco and illnesses. He shares that he has cut back on smoking and this has helped. He uses his inhaler just 1-2x/month and has had no exacerbations or nighttime awakenings 2/2 asthmas symptoms.     Patient Active Problem List    Diagnosis Date Noted     Abnormal weight gain 2016     Priority: Medium     Allodynia 2016     Priority: Medium     Pain in surgical scar 2016     Priority: Medium     Intermittent asthma 2014     Priority: Medium     Other chronic pain 10/16/2012     Priority: Medium     Insomnia 10/16/2012     Priority: Medium     Problem list name updated by automated process. Provider to review       Opioid type " dependence (H) 10/16/2012     Priority: Medium     Problem list name updated by automated process. Provider to review       Health Care Home 10/16/2012     Priority: Medium     DX V65.8 REPLACED WITH [facility]ALTH CARE HOME (04/08/2013)       Back pain 10/16/2012     Priority: Medium         Current Outpatient Prescriptions on File Prior to Visit:  [DISCONTINUED] gabapentin (NEURONTIN) 400 MG capsule Take 2 capsules (800 mg) by mouth 3 times daily   [DISCONTINUED] naproxen (NAPROSYN) 375 MG tablet Take 1 tablet (375 mg) by mouth 2 times daily as needed   [DISCONTINUED] albuterol (PROVENTIL HFA) 108 (90 BASE) MCG/ACT inhaler Inhale 2 puffs into the lungs every 6 hours     No current facility-administered medications on file prior to visit.      No Known Allergies         Review of Systems:   CONSTITUTIONAL: no fatigue, no unexpected change in weight  SKIN: no worrisome rashes, no worrisome moles, no worrisome lesions  EYES: no acute vision problems or changes  ENT: no ear problems, no mouth problems, no throat problems  RESP: no significant cough, no shortness of breath  CV: no chest pain, no palpitations, no new or worsening peripheral edema  GI: no nausea, no vomiting, no constipation, no diarrhea            Physical Exam:     Vitals:    01/23/18 1055 01/23/18 1056   BP: (!) 159/100 (!) 147/100   Pulse: 79    Resp: 18    Temp: 97.8  F (36.6  C)    TempSrc: Oral    SpO2: 95%    Weight: (!) 319 lb (144.7 kg)      Body mass index is 44.49 kg/(m^2).  Vitals were reviewed   Vital signs normal except: Hypertensive range pressures.     GENERAL: Heavy male with poor dentition. He is cooperative. He exhibits poor eye contact but is not in acute distress. He appears somewhat anxious with frequent fidgeting.   RESP: lungs clear to auscultation - no rales, no rhonchi, no wheezes  CV: regular rates and rhythm, normal S1 S2, no S3 or S4 and no murmur, no click or rub -      Assessment and Plan    Vaughn Bates is a 37 year old   male with history of chronic pain and opioid dependence who presents for medication refills and reconciliation. Vaughn appears to be on a good path as far as his treatment regimen and regular follow up at a specialized treatment center. Medications were refilled. He will need to see us back in two months for additional refills. Otherwise, we discussed that he will need to follow up with someone in [place] if he ends up staying there more long-term. He was quite agitated during today's visit. His hypertension ranges recorded today may be a symptom of opioid withdrawal.  He may need to have a BP cuff on hand to record BP prior to his methadone treatment so there is one less confounding variable. We will revisit this at his upcoming visit in two months.      1. Chronic pain syndrome  We will refill his pain medications and send to our pharmacy for him to collect today. I would like him to call us with the name of a pharmacy in [place] where he would like us to send the second month. I am not comfortable prescribing beyond 2 months without seeing him again. He assures me that he will likely be back in the city in two months, so we can see him back. We can then revisit his medications at that follow up visit. If he is not back, he will need to establish care in [place].  He is comfortable with this plan. All other questions answered.   - gabapentin (NEURONTIN) 400 MG capsule; Take 2 capsules (800 mg) by mouth 3 times daily  Dispense: 180 capsule; Refill: 0    2. Other chronic pain  - naproxen (NAPROSYN) 375 MG tablet; Take 1 tablet (375 mg) by mouth 2 times daily as needed  Dispense: 180 tablet; Refill: 0  - lidocaine (LIDODERM) 5 % Patch; Place 1 patch onto the skin every 24 hours  Dispense: 30 patch; Refill: 0    3. Pain in surgical scar  - naproxen (NAPROSYN) 375 MG tablet; Take 1 tablet (375 mg) by mouth 2 times daily as needed  Dispense: 180 tablet; Refill: 0  - lidocaine (LIDODERM) 5 % Patch; Place 1 patch onto the  skin every 24 hours  Dispense: 30 patch; Refill: 0    4. Allodynia    5. Intermittent asthma, uncomplicated  Well controlled with decreased tobacco smoking. Triggers are smoking and illnesses. Action plan discussed and sent home with patient today. Refills ordered.   - albuterol (PROVENTIL HFA) 108 (90 BASE) MCG/ACT Inhaler; Inhale 2 puffs into the lungs every 6 hours  Dispense: 1 Inhaler; Refill: 1  - Asthma Action Plan (AAP)    Options for treatment and follow-up care were reviewed with the patient. Vaughn Bates  engaged in the decision making process and verbalized understanding of the options discussed and agreed with the final plan.      I, Dee Flores, MS3, am acting as the scribe for Pedro Bess MD. All aspects of the exam and documentation were approved by the attending physician.    The medical student acted as scribe and the encounter documented above was completely performed by myself and the documentation reflects the work I have performed today. Pedro Bess MD

## 2018-01-29 RX ORDER — GABAPENTIN 400 MG/1
800 CAPSULE ORAL 3 TIMES DAILY
Qty: 180 CAPSULE | Refills: 2 | Status: SHIPPED | OUTPATIENT
Start: 2018-01-29 | End: 2018-05-07

## 2018-05-07 DIAGNOSIS — G89.4 CHRONIC PAIN SYNDROME: ICD-10-CM

## 2018-05-07 NOTE — TELEPHONE ENCOUNTER
Request for medication refill:    Date of last visit at clinic: 1/23/18    Please complete refill if appropriate and CLOSE ENCOUNTER.    Closing the encounter signifies the refill is complete.    If refill has been denied, please complete the smart phrase .smirefuse and route it to the [facility] RN TRIAGE pool to inform the patient and the pharmacy.    Jaye Whittaker, CMA

## 2018-05-08 RX ORDER — GABAPENTIN 400 MG/1
800 CAPSULE ORAL 3 TIMES DAILY
Qty: 180 CAPSULE | Refills: 2 | Status: SHIPPED | OUTPATIENT
Start: 2018-05-08 | End: 2018-07-23

## 2018-07-23 DIAGNOSIS — G89.4 CHRONIC PAIN SYNDROME: ICD-10-CM

## 2018-07-23 RX ORDER — GABAPENTIN 400 MG/1
800 CAPSULE ORAL 3 TIMES DAILY
Qty: 180 CAPSULE | Refills: 0 | Status: SHIPPED | OUTPATIENT
Start: 2018-07-23 | End: 2018-08-23

## 2018-07-23 NOTE — TELEPHONE ENCOUNTER
Patient given 1 month supply and advised via letter that he will need to be seen in clinic before additional refills will be given.  He may want to transition his care to a provider in [place]. MD for ease of care if he is still living up there.      Eber Joel MD  Chief Resident  [facility]  [phone]

## 2018-07-23 NOTE — TELEPHONE ENCOUNTER

## 2018-07-23 NOTE — LETTER
[facility]  [address],  [address]  [facility] [address]  Phone: [phone]  Fax: [phone]    July 23, 2018        Vaughn Bates  [address]  [place] [address]         July 23, 2018      Vaughn Bates  [address]  [place] [address]          Dear Vaughn,      A request for a refill on your gabapentin prescription was sent to us from your pharmacy. I have notified the pharmacy to allow you one more refill. It is time for your recheck at the clinic so we can monitor the medication and continue to prescribe it safely. Please make clinic appointment with me or another provider at [facility] in the next one month.  Thank you for choosing us as your healthcare provider.      Sincerely,    Eber Joel MD                  Sincerely,        Eber Joel MD

## 2018-08-23 DIAGNOSIS — G89.4 CHRONIC PAIN SYNDROME: ICD-10-CM

## 2018-08-23 RX ORDER — GABAPENTIN 400 MG/1
800 CAPSULE ORAL 3 TIMES DAILY
Qty: 180 CAPSULE | Refills: 2 | Status: SHIPPED | OUTPATIENT
Start: 2018-08-23 | End: 2018-11-15

## 2018-08-23 NOTE — TELEPHONE ENCOUNTER

## 2018-10-29 ENCOUNTER — DOCUMENTATION ONLY (OUTPATIENT)
Dept: FAMILY MEDICINE | Facility: CLINIC | Age: 38
End: 2018-10-29

## 2018-10-29 NOTE — PROGRESS NOTES
"When opening a documentation only encounter, be sure to enter in \"Chief Complaint\" Forms and in \" Comments\" Title of form, description if needed.    Vaughn is a 38 year old  male  Form received via: Fax  Form now resides in: Provider Ready    Tavares Clifton CMA          Form has been completed by provider.     Form sent out via: Fax to [facility] at Fax Number: [phone]  Patient informed: No, Reason:faxed to facility  Output date: October 29, 2018    Tavares Clifton CMA      **Please close the encounter**      "

## 2018-11-15 DIAGNOSIS — G89.4 CHRONIC PAIN SYNDROME: ICD-10-CM

## 2018-11-15 NOTE — TELEPHONE ENCOUNTER

## 2018-11-15 NOTE — TELEPHONE ENCOUNTER
Verify that the refill encounter hasn't been started Yes    [facility] phone call message- patient requesting a refill:    Full Medication Name: Gabapentin    Dose: 800mg daily     Pharmacy confirmed as : Yes    Medication tab checked to see if medication has been sent  Yes    Additional Comments: please refill medication pt is out and states they had pharmacy send us multiple requests     OK to leave a message on voice mail? Yes    Advised patient refill may take up to 2 business days? Yes    Primary language: English      needed? No    Call taken on November 15, 2018 at 12:41 PM by Judith Aguilar    Route to [facility] MED REFILL

## 2018-11-19 NOTE — TELEPHONE ENCOUNTER
Patient is calling to check on the status of the medication. I let patient know about 2 business day policy. Patient understood.  If provider can't fill, please call patient to schedule appt.

## 2018-11-19 NOTE — TELEPHONE ENCOUNTER
Message routed to PCP.     If patient needs appointment, please route directly to  to schedule.    Tg Alvarado RN

## 2018-11-20 RX ORDER — GABAPENTIN 400 MG/1
800 CAPSULE ORAL 3 TIMES DAILY
Qty: 180 CAPSULE | Refills: 2 | Status: SHIPPED | OUTPATIENT
Start: 2018-11-20 | End: 2019-02-07

## 2019-02-06 DIAGNOSIS — G89.4 CHRONIC PAIN SYNDROME: ICD-10-CM

## 2019-02-06 NOTE — TELEPHONE ENCOUNTER

## 2019-02-07 RX ORDER — GABAPENTIN 400 MG/1
800 CAPSULE ORAL 3 TIMES DAILY
Qty: 180 CAPSULE | Refills: 2 | Status: SHIPPED | OUTPATIENT
Start: 2019-02-07 | End: 2019-05-01

## 2019-04-29 ENCOUNTER — TELEPHONE (OUTPATIENT)
Dept: FAMILY MEDICINE | Facility: CLINIC | Age: 39
End: 2019-04-29

## 2019-04-29 DIAGNOSIS — G89.4 CHRONIC PAIN SYNDROME: ICD-10-CM

## 2019-04-29 NOTE — TELEPHONE ENCOUNTER
Verify that the refill encounter hasn't been started Yes    [facility] phone call message- patient requesting a refill:    Full Medication Name: gabapentin (NEURONTIN) 400 MG capsule    Dose: Take 2 capsules (800 mg) by mouth 3 times daily - Oral     Pharmacy confirmed as   [id number] - [place] - [address]  [address] [place] [address]  Phone: [phone] Fax: [phone]    : Yes    Medication tab checked to see if medication has been sent  Yes    Additional Comments:      OK to leave a message on voice mail? Yes    Advised patient refill may take up to 2 business days? Yes    Primary language: English      needed? No    Call taken on April 29, 2019 at 11:50 AM by Jasmin Flores to [facility] MED REFILL

## 2019-04-29 NOTE — TELEPHONE ENCOUNTER

## 2019-05-01 RX ORDER — GABAPENTIN 400 MG/1
800 CAPSULE ORAL 3 TIMES DAILY
Qty: 180 CAPSULE | Refills: 2 | Status: SHIPPED | OUTPATIENT
Start: 2019-05-01 | End: 2019-07-19

## 2019-07-18 DIAGNOSIS — R52 PAIN IN SURGICAL SCAR: ICD-10-CM

## 2019-07-18 DIAGNOSIS — G89.29 OTHER CHRONIC PAIN: ICD-10-CM

## 2019-07-18 DIAGNOSIS — L90.5 PAIN IN SURGICAL SCAR: ICD-10-CM

## 2019-07-18 DIAGNOSIS — J45.20 INTERMITTENT ASTHMA, UNCOMPLICATED: ICD-10-CM

## 2019-07-18 DIAGNOSIS — G89.4 CHRONIC PAIN SYNDROME: ICD-10-CM

## 2019-07-19 DIAGNOSIS — G89.4 CHRONIC PAIN SYNDROME: ICD-10-CM

## 2019-07-19 RX ORDER — GABAPENTIN 400 MG/1
800 CAPSULE ORAL 3 TIMES DAILY
Qty: 30 CAPSULE | Refills: 2 | Status: SHIPPED | OUTPATIENT
Start: 2019-07-19 | End: 2019-07-22

## 2019-07-19 NOTE — TELEPHONE ENCOUNTER
RN contacted patient and informed him that it has been over a year since we have seen him and would really need to get him in for a visit. He agreed to schedule and was transferred to the call center to schedule since he will need new insurance entered.    RN provided a 5 day supply to get patient to appointment, he is scheduled on Tuesday.  Digna Brower RN

## 2019-07-19 NOTE — TELEPHONE ENCOUNTER

## 2019-07-19 NOTE — TELEPHONE ENCOUNTER
"Request for medication refill Naproxen, Gabapentin    Providers if patient needs an appointment and you are willing to give a one month supply please refill for one month and  send a letter/MyChart using \".SMILLIMITEDREFILL\" .smillimited and route chart to \"P SMI \" (Giving one month refill in non controlled medications is strongly recommended before denial)    If refill has been denied, meaning absolutely no refills without visit, please complete the smart phrase \".smirxrefuse\" and route it to the \"P SMI MED REFILLS\"  pool to inform the patient and the pharmacy.    Janeth Zamora, CMA        "

## 2019-07-19 NOTE — TELEPHONE ENCOUNTER
Patient calling to check status of med refills (gabapentin and naproxen). Patient is out of both meds and would like to pick them up today (before the weekend). Patient uses [facility] in [place]. Patient can be reached at [phone].

## 2019-07-22 ENCOUNTER — TELEPHONE (OUTPATIENT)
Dept: FAMILY MEDICINE | Facility: CLINIC | Age: 39
End: 2019-07-22

## 2019-07-22 RX ORDER — GABAPENTIN 400 MG/1
800 CAPSULE ORAL 3 TIMES DAILY
Qty: 180 CAPSULE | Refills: 2 | Status: SHIPPED | OUTPATIENT
Start: 2019-07-22 | End: 2019-10-21

## 2019-07-22 NOTE — TELEPHONE ENCOUNTER
Form is not in [employer] folders or [employer] folders, or on any fax machines currently. RN left a message for [employer] that we did not receive the form and patient needs it ASAP, gave the  fax number and our phone number. Also requested to see if we can do anything verbally over the phone so that patient can come to appointment.    RN contacted patient with this same information and requested that he contact them as well so that this matter can be resolved, I did mention to him that we are down to the wire and I am unsure what all the form entails, but it may not be complete in time for a visit tomorrow especially if we do not receive the form very soon. I did tell him to call again tomorrow and we can update him on what is going on.  Digna Brower RN

## 2019-07-22 NOTE — TELEPHONE ENCOUNTER
[facility] phone call message- patient requesting form status:    Type of Form: medical ride through insurance    Given to: Faxed (unsure if FD or med recs)    Submitted: within 10 business days       Date Submitted: 7/19/19     Date Needed by: ASAP; prior to appointment at 1:00pm on 7/23/19 with Dr Nichols    Additional Details: Patient calling to check status of form for insurance company that needs to be completed prior to his appointment on 7/23/19 so that he can get a ride to his appointment.  at insurance company is [employer], phone number [phone].     Advised Patient it may take up to 7 - 10 business days: No    OK to leave a message on voice mail? Yes    Primary language: English      needed? No    Call taken on July 22, 2019 at 3:50 PM by Esther Chavis    Route to [facility]ARHAT CRUZ (color team) PCS

## 2019-07-23 ENCOUNTER — DOCUMENTATION ONLY (OUTPATIENT)
Dept: FAMILY MEDICINE | Facility: CLINIC | Age: 39
End: 2019-07-23

## 2019-07-23 NOTE — PROGRESS NOTES
"When opening a documentation only encounter, be sure to enter in \"Chief Complaint\" Forms and in \" Comments\" Title of form, description if needed.    Vaughn is a 38 year old  male  Form received via: Fax  Form now resides in: Provider Ready    BUD Quinonez 9:17 AM July 23, 2019                    "

## 2019-07-24 DIAGNOSIS — L90.5 PAIN IN SURGICAL SCAR: ICD-10-CM

## 2019-07-24 DIAGNOSIS — G89.29 OTHER CHRONIC PAIN: ICD-10-CM

## 2019-07-24 DIAGNOSIS — G89.4 CHRONIC PAIN SYNDROME: ICD-10-CM

## 2019-07-24 DIAGNOSIS — R52 PAIN IN SURGICAL SCAR: ICD-10-CM

## 2019-07-24 RX ORDER — GABAPENTIN 400 MG/1
800 CAPSULE ORAL 3 TIMES DAILY
Qty: 180 CAPSULE | Refills: 2 | Status: CANCELLED | OUTPATIENT
Start: 2019-07-24

## 2019-07-24 NOTE — TELEPHONE ENCOUNTER
Verify that the refill encounter hasn't been started Yes    [facility] phone call message- patient requesting a refill:    Full Medication Name:   gabapentin (NEURONTIN) 400 MG capsule  naproxen (NAPROSYN) 375 MG tablet    Dose: See chart     Pharmacy confirmed as   [id number] - [place], [place] - [address]  [address]  [place] [address]  Phone: [phone] Fax: [phone]  : Yes    Medication tab checked to see if medication has been sent  Yes    Additional Comments: Per patient, requesting a temporary fill to get him to his appointment scheduled for Tuesday, he is currently out. Patient no showed his visit on 7/23/19.     OK to leave a message on voice mail? Yes    Advised patient refill may take up to 2 business days? Yes    Primary language: English      needed? No    Call taken on July 24, 2019 at 12:18 PM by Jaclyn Flores to [facility] MED REFILL

## 2019-07-24 NOTE — PROGRESS NOTES
Form has been completed by provider.     Form sent out via: Fax to [facility], [facility], and [facility] at Fax Number: [phone]  Patient informed: N/A  Output date: July 24, 2019    Juhi Mcgovern CMA

## 2019-07-24 NOTE — TELEPHONE ENCOUNTER
Dr. Joel filled the gabapentin for a full supply. RN called and ensured that the pharmacy received the prescription. They have it and they are working on getting it filled. Patient aware that it is taken care of.  Digna Brower RN

## 2019-07-24 NOTE — TELEPHONE ENCOUNTER
Routing to provider who last saw patient (Wisam) to advise. RN already provided a 5 day supply last week to get him to the appointment that he no showed. He has not been seen in over a year.  Digna Brower RN

## 2019-07-30 ENCOUNTER — OFFICE VISIT (OUTPATIENT)
Dept: FAMILY MEDICINE | Facility: CLINIC | Age: 39
End: 2019-07-30
Payer: MEDICAID

## 2019-07-30 VITALS
HEIGHT: 71 IN | OXYGEN SATURATION: 96 % | DIASTOLIC BLOOD PRESSURE: 92 MMHG | BODY MASS INDEX: 43.76 KG/M2 | SYSTOLIC BLOOD PRESSURE: 135 MMHG | WEIGHT: 312.6 LBS | HEART RATE: 104 BPM | TEMPERATURE: 98.1 F

## 2019-07-30 DIAGNOSIS — R52 PAIN IN SURGICAL SCAR: ICD-10-CM

## 2019-07-30 DIAGNOSIS — G89.29 OTHER CHRONIC PAIN: ICD-10-CM

## 2019-07-30 DIAGNOSIS — J45.20 INTERMITTENT ASTHMA, UNCOMPLICATED: ICD-10-CM

## 2019-07-30 DIAGNOSIS — G89.29 CHRONIC BILATERAL LOW BACK PAIN WITHOUT SCIATICA: ICD-10-CM

## 2019-07-30 DIAGNOSIS — L90.5 PAIN IN SURGICAL SCAR: ICD-10-CM

## 2019-07-30 DIAGNOSIS — R20.2 NUMBNESS AND TINGLING IN BOTH HANDS: ICD-10-CM

## 2019-07-30 DIAGNOSIS — R20.0 NUMBNESS AND TINGLING IN BOTH HANDS: ICD-10-CM

## 2019-07-30 DIAGNOSIS — M54.50 CHRONIC BILATERAL LOW BACK PAIN WITHOUT SCIATICA: ICD-10-CM

## 2019-07-30 DIAGNOSIS — Z00.00 ROUTINE GENERAL MEDICAL EXAMINATION AT A HEALTH CARE FACILITY: Primary | ICD-10-CM

## 2019-07-30 RX ORDER — LIDOCAINE 4 G/G
1 PATCH TOPICAL EVERY 24 HOURS
Qty: 30 PATCH | Refills: 0 | Status: SHIPPED | OUTPATIENT
Start: 2019-07-30 | End: 2019-11-15

## 2019-07-30 RX ORDER — ALBUTEROL SULFATE 90 UG/1
2 AEROSOL, METERED RESPIRATORY (INHALATION) EVERY 6 HOURS
Qty: 1 INHALER | Refills: 3 | Status: SHIPPED | OUTPATIENT
Start: 2019-07-30 | End: 2023-05-02

## 2019-07-30 ASSESSMENT — MIFFLIN-ST. JEOR: SCORE: 2364.81

## 2019-07-30 NOTE — PROGRESS NOTES
"       HPI       Vaughn Bates is a 38 year old  who presents for   Chief Complaint   Patient presents with     Physical     update meds, hands get numb, 3 to 4 times a day     Patient is a 38-year-old male who presents for evaluation of numbness in his hands.  He reports that these have been ongoing symptoms for the last year and a half occurring 3-4 times per day.  He states that they have only been happening once every couple of weeks lately which is a significant decrease in frequency since previous when they were occurring every single day.  He is unsure of any triggers.  He denies any pain with them.  No positional triggers.  Most recently happened yesterday afternoon.  Sometimes is bilateral but other times it is one or the other.  Denies any headache, weakness, lower extremity numbness.  Previously was seen here and suspected to be ulnar neuropathy which proved with bracing.  He reports wearing gloves which she was prescribed here which also helped as well.    +++++++      Problem, Medication and Allergy Lists were reviewed and updated if needed..    Patient is an established patient of this clinic..         Review of Systems:   Review of Systems         Physical Exam:     Vitals:    07/30/19 1345 07/30/19 1346   BP: (!) 154/91 (!) 135/92   Pulse: 96    Temp: 98.1  F (36.7  C)    TempSrc: Oral    SpO2: 96%    Weight: 141.8 kg (312 lb 9.6 oz)    Height: 1.795 m (5' 10.67\")      Body mass index is 44.01 kg/m .  Vitals were reviewed and were normal other than mild diastolic hypertension.      Physical Exam   Constitutional: He appears well-developed and well-nourished. No distress.   HENT:   Head: Normocephalic and atraumatic.   Poor dentition.    Neck: Normal range of motion.   Cardiovascular: Normal rate, regular rhythm and normal heart sounds. Exam reveals no gallop and no friction rub.   No murmur heard.  Pulmonary/Chest: Effort normal and breath sounds normal. No stridor. No respiratory distress. He has " no wheezes. He has no rales.   Musculoskeletal: Normal range of motion. He exhibits no edema or deformity.   Neurological: He is alert. No sensory deficit. He exhibits normal muscle tone. Coordination normal.   Skin: Skin is warm and dry. He is not diaphoretic.   Psychiatric: He has a normal mood and affect.         Results:   No testing ordered today    Assessment and Plan      1. Intermittent asthma, uncomplicated  Refill.   - albuterol (PROVENTIL HFA) 108 (90 Base) MCG/ACT inhaler; Inhale 2 puffs into the lungs every 6 hours  Dispense: 1 Inhaler; Refill: 3    2. Other chronic pain  Refills.   - Lidocaine (LIDOCARE) 4 % Patch; Place 1 patch onto the skin every 24 hours  Dispense: 30 patch; Refill: 0  - naproxen (NAPROSYN) 375 MG tablet; Take 1 tablet (375 mg) by mouth 2 times daily as needed for moderate pain  Dispense: 30 tablet; Refill: 0    3. Chronic bilateral low back pain without sciatica  - Lidocaine (LIDOCARE) 4 % Patch; Place 1 patch onto the skin every 24 hours  Dispense: 30 patch; Refill: 0    4. Pain in surgical scar  - naproxen (NAPROSYN) 375 MG tablet; Take 1 tablet (375 mg) by mouth 2 times daily as needed for moderate pain  Dispense: 30 tablet; Refill: 0    6. Numbness and tingling in both hands  Unclear diagnosis for the patient's numbness and tingling in both of his hands.  Considerations would be hepatitis C, diabetes, cervical lesion.  HIV and syphilis previously negative.  Given that his symptoms are overall improving we decided together to continue to monitor.  If not resolved on his next visit would check hepatitis C and A1c.  He will follow-up immediately if any worsening otherwise for next med refill in 1 to 2 months.       There are no discontinued medications.    Options for treatment and follow-up care were reviewed with the patient. Vaughn Bates  engaged in the decision making process and verbalized understanding of the options discussed and agreed with the final plan.    Jacob  MD Robert

## 2019-07-30 NOTE — PROGRESS NOTES
Preceptor Attestation:   Patient seen, evaluated and discussed with the resident. I have verified the content of the note, which accurately reflects my assessment of the patient and the plan of care.   Supervising Physician:  Libia Ibrahim MD

## 2019-07-30 NOTE — PROGRESS NOTES
"  Male Physical Note          HPI         Concerns today: {[facility] CONCERNS:637767}  {:093848}    Patient Active Problem List   Diagnosis     Other chronic pain     Insomnia     Opioid type dependence (H)     Health Care Home     Back pain     Intermittent asthma     Pain in surgical scar     Allodynia     Abnormal weight gain       Past Medical History:   Diagnosis Date     Back pain, chronic        Previous Medical Care   ***     No family history on file.           Review of Systems:     Review of Systems:  {ROS NORMAL:543521::\"CONSTITUTIONAL: NEGATIVE for fever, chills, change in weight\",\"INTEGUMENTARY/SKIN: NEGATIVE for worrisome rashes, moles or lesions\",\"EYES: NEGATIVE for vision changes or irritation\",\"ENT/MOUTH: NEGATIVE for ear, mouth and throat problems\",\"RESP: NEGATIVE for significant cough or SOB\",\"BREAST: NEGATIVE for masses, tenderness or discharge\",\"CV: NEGATIVE for chest pain, palpitations or peripheral edema\",\"GI: NEGATIVE for nausea, abdominal pain, heartburn, or change in bowel habits\",\": NEGATIVE for frequency, dysuria, or hematuria\",\"MUSCULOSKELETAL: NEGATIVE for significant arthralgias or myalgia\",\"NEURO: NEGATIVE for weakness, dizziness or paresthesias\",\"ENDOCRINE: NEGATIVE for temperature intolerance, skin/hair changes\",\"HEME/ALLERGY: NEGATIVE for bleeding problems\",\"PSYCHIATRIC: NEGATIVE for changes in mood or affect\"}  Sleep:   Do you snore most or the night (as reported by a family member)? {NO IS DEFAULT/YES:64426801::\"No\"}  Do you feel sleepy or extremely tired during most of the day? {NO IS DEFAULT/YES:78535188::\"No\"}  {If both questions are answered with \"yes\" consider adding snoring in the problem list and evaluating the patient for MARIANO with the dot phrase \".smicsleep\" either this visit or at a follow up visit }           Social History     Social History     Socioeconomic History     Marital status: Single     Spouse name: Not on file     Number of children: Not on file     " "Years of education: Not on file     Highest education level: Not on file   Occupational History     Not on file   Social Needs     Financial resource strain: Not on file     Food insecurity:     Worry: Not on file     Inability: Not on file     Transportation needs:     Medical: Not on file     Non-medical: Not on file   Tobacco Use     Smoking status: Current Every Day Smoker     Packs/day: 0.50     Types: Cigarettes     Smokeless tobacco: Never Used   Substance and Sexual Activity     Alcohol use: No     Drug use: No     Sexual activity: Not on file   Lifestyle     Physical activity:     Days per week: Not on file     Minutes per session: Not on file     Stress: Not on file   Relationships     Social connections:     Talks on phone: Not on file     Gets together: Not on file     Attends Alevism service: Not on file     Active member of club or organization: Not on file     Attends meetings of clubs or organizations: Not on file     Relationship status: Not on file     Intimate partner violence:     Fear of current or ex partner: Not on file     Emotionally abused: Not on file     Physically abused: Not on file     Forced sexual activity: Not on file   Other Topics Concern     Parent/sibling w/ CABG, MI or angioplasty before 65F 55M? Not Asked   Social History Narrative     Not on file       Marital Status:{MARITAL STATUS:772205}  Who lives in your household? ***    Has anyone hurt you physically, for example by pushing, hitting, slapping or kicking you or forcing you to have sex? {[facility] DENIES:949726::\"Denies\"}  Do you feel threatened or controlled by a partner, ex-partner or anyone in your life? {[facility] DENIES:425237::\"Denies\"}    Sexual Health     Sexual concerns: {YES / NO:832153::\"Yes\"}   STI History: {NEG/POS-NEG DEFAULT:197206::\"Neg\"}      Recommended Screening     {[facility] USPSTF LEVEL A:765916}  {[facility] USPSTF LEVEL B:875401}             Physical Exam:     Vitals: There were no vitals taken for this visit.  BMI= " "There is no height or weight on file to calculate BMI.  {EXAM - MALE- [facility] stripe:694811::\"GENERAL: healthy, alert and no distress\",\"EYES: Eyes grossly normal to inspection, extraocular movements - intact, and PERRL\",\"HENT: ear canals- normal; TMs- normal; Nose- normal; Mouth- no ulcers, no lesions\",\"NECK: no tenderness, no adenopathy, no asymmetry, no masses, no stiffness; thyroid- normal to palpation\",\"RESP: lungs clear to auscultation - no rales, no rhonchi, no wheezes\",\"BREAST: no masses, no tenderness, no nipple discharge, no palpable axillary masses or adenopathy\",\"CV: regular rates and rhythm, normal S1 S2, no S3 or S4 and no murmur, no click or rub -\",\"ABDOMEN: soft, no tenderness, no  hepatosplenomegaly, no masses, normal bowel sounds\",\"MS: extremities- no gross deformities noted, no edema\",\"SKIN: no suspicious lesions, no rashes\",\"NEURO: strength and tone- normal, sensory exam- grossly normal, mentation- intact, speech- normal, reflexes- symmetric\",\"BACK: no CVA tenderness, no paralumbar tenderness\",\"- male: testicles- normal, no atrophy, no masses;  no inguinal hernias\",\"RECTAL- male: no masses, no hemorhoids, Prostate- symmetric, no  nodularity, no masses, no hypertrophy\",\"PSYCH: Alert and oriented times 3; speech- coherent , normal rate and volume; able to articulate logical thoughts, able to abstract reason, no tangential thoughts, no hallucinations or delusions, affect- normal\",\"LYMPHATICS: ant. cervical- normal, post. cervical- normal, axillary- normal, supraclavicular- normal, inguinal- normal\"}    Assessment and Plan      Diagnoses and all orders for this visit:    Routine general medical examination at a health care facility        {[facility] ASSESSMENT:374091::\"1. Health Care Maintenance: Normal Physical Exam\"}    PLAN:  1.{[facility] MALE PLAN:765022}  2. ***  3. ***  4. ***    Options for treatment and follow-up care were reviewed with the patient. Vaughn MONTOYA Paulo and/or guardian engaged in the decision " making process and verbalized understanding of the options discussed and agreed with the final plan.    Jacob Jones MD

## 2019-07-30 NOTE — PATIENT INSTRUCTIONS
Here is the plan from today's visit    1. Intermittent asthma, uncomplicated  - albuterol (PROVENTIL HFA) 108 (90 Base) MCG/ACT inhaler; Inhale 2 puffs into the lungs every 6 hours  Dispense: 1 Inhaler; Refill: 3    3. Other chronic pain  - Lidocaine (LIDOCARE) 4 % Patch; Place 1 patch onto the skin every 24 hours  Dispense: 30 patch; Refill: 0  - naproxen (NAPROSYN) 375 MG tablet; Take 1 tablet (375 mg) by mouth 2 times daily as needed for moderate pain  Dispense: 30 tablet; Refill: 0    4. Chronic bilateral low back pain without sciatica  - Lidocaine (LIDOCARE) 4 % Patch; Place 1 patch onto the skin every 24 hours  Dispense: 30 patch; Refill: 0    5. Pain in surgical scar  Take only as needed. No more than 5 days in a row.   - naproxen (NAPROSYN) 375 MG tablet; Take 1 tablet (375 mg) by mouth 2 times daily as needed for moderate pain  Dispense: 30 tablet; Refill: 0    6. Numbness and tingling in both hands  It sounds like it is very slowly spacing out in the episodes. If worsening things we could consider would be hand therapy, EMG, or neck CT. Come back right away if getting worse.     Please call or return to clinic if your symptoms don't go away.    Follow up plan  As needed.     Thank you for coming to [facility] today.  Lab Testing:  **If you had lab testing today and your results are reassuring or normal they will be mailed to you or sent through dotCloud within 7 days.   **If the lab tests need quick action we will call you with the results.  The phone number we will call with results is # [phone] (home) . If this is not the best number please call our clinic and change the number.  Medication Refills:  If you need any refills please call your pharmacy and they will contact us.   If you need to  your refill at a new pharmacy, please contact the new pharmacy directly. The new pharmacy will help you get your medications transferred faster.   Scheduling:  If you have any concerns about today's  visit or wish to schedule another appointment please call our office during normal business hours [phone] (8-5:00 M-F)  If a referral was made to a [facility] and you don't get a call from central scheduling please call [phone].  If a Mammogram was ordered for you at [facility] call [phone] to schedule or change your appointment.  If you had an XRay/CT/Ultrasound/MRI ordered the number is [phone] to schedule or change your radiology appointment.   Medical Concerns:  If you have urgent medical concerns please call [phone] at any time of the day.    Jaocb Jnoes MD    Preventive Health Recommendations  Male Ages 26 - 39    Yearly exam:             See your health care provider every year in order to  o   Review health changes.   o   Discuss preventive care.    o   Review your medicines if your doctor has prescribed any.    You should be tested each year for STDs (sexually transmitted diseases), if you re at risk.     After age 35, talk to your provider about cholesterol testing. If you are at risk for heart disease, have your cholesterol tested at least every 5 years.     If you are at risk for diabetes, you should have a diabetes test (fasting glucose).  Shots: Get a flu shot each year. Get a tetanus shot every 10 years.     Nutrition:    Eat at least 5 servings of fruits and vegetables daily.     Eat whole-grain bread, whole-wheat pasta and brown rice instead of white grains and rice.     Get adequate Calcium and Vitamin D.     Lifestyle    Exercise for at least 150 minutes a week (30 minutes a day, 5 days a week). This will help you control your weight and prevent disease.     Limit alcohol to one drink per day.     No smoking.     Wear sunscreen to prevent skin cancer.     See your dentist every six months for an exam and cleaning.

## 2019-10-21 DIAGNOSIS — G89.4 CHRONIC PAIN SYNDROME: ICD-10-CM

## 2019-10-21 RX ORDER — GABAPENTIN 400 MG/1
800 CAPSULE ORAL 3 TIMES DAILY
Qty: 180 CAPSULE | Refills: 0 | Status: SHIPPED | OUTPATIENT
Start: 2019-10-21 | End: 2019-10-22

## 2019-10-21 NOTE — TELEPHONE ENCOUNTER
RN sent 30 days per protocol. Routing to PCP to add additional refills if appropriate, or advise if a visit is needed. If visit needed, please route to  to contact patient to schedule.  Digna Brower RN

## 2019-10-21 NOTE — TELEPHONE ENCOUNTER
Verify that the refill encounter hasn't been started Yes    [facility] phone call message- patient requesting a refill:    Full Medication Name: gabapentin (NEURONTIN) 400 MG capsule    Dose: Take 2 capsules (800 mg) by mouth 3 times daily - Oral     Pharmacy confirmed as   [id number] - [place] - [address]  [address] [place] [address]  Phone: [phone] Fax: [phone]  : Yes     Medication tab checked to see if medication has been sent  Yes    Additional Comments: Patient out of medication. Please advise.     OK to leave a message on voice mail? Yes    Advised patient refill may take up to 2 business days? Yes    Primary language: English      needed? No    Call taken on October 21, 2019 at 11:57 AM by Jaclyn Flores to [facility] MED REFILL

## 2019-10-22 RX ORDER — GABAPENTIN 400 MG/1
800 CAPSULE ORAL 3 TIMES DAILY
Qty: 180 CAPSULE | Refills: 0 | Status: SHIPPED | OUTPATIENT
Start: 2019-10-22 | End: 2019-11-15

## 2019-10-22 NOTE — TELEPHONE ENCOUNTER
Patient calling to check status of medication refill. Author informed patient that a 30 day refill was sent to pharmacy but patient would need an appointment for further refills. Patient expressed understanding and schedule an appointment with Dr. Sage on 11/20/19.

## 2019-11-15 ENCOUNTER — OFFICE VISIT (OUTPATIENT)
Dept: FAMILY MEDICINE | Facility: CLINIC | Age: 39
End: 2019-11-15
Payer: MEDICAID

## 2019-11-15 VITALS
DIASTOLIC BLOOD PRESSURE: 91 MMHG | SYSTOLIC BLOOD PRESSURE: 134 MMHG | HEART RATE: 71 BPM | RESPIRATION RATE: 16 BRPM | TEMPERATURE: 97.5 F | OXYGEN SATURATION: 96 %

## 2019-11-15 DIAGNOSIS — G89.4 CHRONIC PAIN SYNDROME: Primary | ICD-10-CM

## 2019-11-15 DIAGNOSIS — G89.29 OTHER CHRONIC PAIN: ICD-10-CM

## 2019-11-15 DIAGNOSIS — L90.5 PAIN IN SURGICAL SCAR: ICD-10-CM

## 2019-11-15 DIAGNOSIS — R52 PAIN IN SURGICAL SCAR: ICD-10-CM

## 2019-11-15 RX ORDER — GABAPENTIN 400 MG/1
800 CAPSULE ORAL 3 TIMES DAILY
Qty: 180 CAPSULE | Refills: 1 | Status: SHIPPED | OUTPATIENT
Start: 2019-11-15 | End: 2020-01-13

## 2019-11-15 RX ORDER — GABAPENTIN 400 MG/1
800 CAPSULE ORAL 3 TIMES DAILY
Qty: 180 CAPSULE | Refills: 1 | Status: SHIPPED | OUTPATIENT
Start: 2019-11-15 | End: 2019-11-15

## 2019-11-15 ASSESSMENT — ENCOUNTER SYMPTOMS
DIARRHEA: 0
SINUS PAIN: 0
FEVER: 0
UNEXPECTED WEIGHT CHANGE: 0
EYE PAIN: 0
VOMITING: 0
WHEEZING: 0
PALPITATIONS: 0
BRUISES/BLEEDS EASILY: 0
JOINT SWELLING: 0
ABDOMINAL DISTENTION: 0
HEADACHES: 0
CHEST TIGHTNESS: 0
FREQUENCY: 0
WEAKNESS: 0
SINUS PRESSURE: 0
HYPERACTIVE: 0
NERVOUS/ANXIOUS: 0
POLYPHAGIA: 0
HEMATURIA: 0
DIZZINESS: 0
POLYDIPSIA: 0
DYSPHORIC MOOD: 0
EYE REDNESS: 0
LIGHT-HEADEDNESS: 0
EYE DISCHARGE: 0
SLEEP DISTURBANCE: 0
STRIDOR: 0
NUMBNESS: 0
DIFFICULTY URINATING: 0
CONSTIPATION: 0
NAUSEA: 0
FACIAL SWELLING: 0
SHORTNESS OF BREATH: 1
ARTHRALGIAS: 1
COUGH: 0
FATIGUE: 0
DIAPHORESIS: 0
RHINORRHEA: 0
ABDOMINAL PAIN: 0
SORE THROAT: 0
MYALGIAS: 1
TREMORS: 0
CONFUSION: 0
APNEA: 0
EYE ITCHING: 0
TROUBLE SWALLOWING: 0
BACK PAIN: 1
CHILLS: 0
DYSURIA: 0
AGITATION: 0
ADENOPATHY: 0

## 2019-11-15 ASSESSMENT — ANXIETY QUESTIONNAIRES
7. FEELING AFRAID AS IF SOMETHING AWFUL MIGHT HAPPEN: NOT AT ALL
3. WORRYING TOO MUCH ABOUT DIFFERENT THINGS: NOT AT ALL
6. BECOMING EASILY ANNOYED OR IRRITABLE: NOT AT ALL
GAD7 TOTAL SCORE: 0
5. BEING SO RESTLESS THAT IT IS HARD TO SIT STILL: NOT AT ALL
1. FEELING NERVOUS, ANXIOUS, OR ON EDGE: NOT AT ALL
IF YOU CHECKED OFF ANY PROBLEMS ON THIS QUESTIONNAIRE, HOW DIFFICULT HAVE THESE PROBLEMS MADE IT FOR YOU TO DO YOUR WORK, TAKE CARE OF THINGS AT HOME, OR GET ALONG WITH OTHER PEOPLE: NOT DIFFICULT AT ALL
2. NOT BEING ABLE TO STOP OR CONTROL WORRYING: NOT AT ALL

## 2019-11-15 ASSESSMENT — PAIN SCALES - GENERAL: PAINLEVEL: NO PAIN (0)

## 2019-11-15 ASSESSMENT — PATIENT HEALTH QUESTIONNAIRE - PHQ9
SUM OF ALL RESPONSES TO PHQ QUESTIONS 1-9: 0
5. POOR APPETITE OR OVEREATING: NOT AT ALL

## 2019-11-15 NOTE — NURSING NOTE
Patient stopped at  and requested his prescriptions be sent to the [facility] in [place] as he does not have time to stick around here to have it filled. RN re-sent them to his designated pharmacy.  Digna Brower RN

## 2019-11-15 NOTE — PROGRESS NOTES
HPI       Vaughn Bates is a 39 year old  who presents for   Chief Complaint   Patient presents with     Medication Follow-up     Patient is here for medication follow up no other concerns   Also interested in establishing new PCP today      Medication check  Refill gabapentin, naproxen as needed (uses maybe once or twice a week)  Not using lidocaine patches- not covered by insurance      Chronic Pain Follow-Up Visit    Location of pain: back pain  Analgesia/pain control:        Recent changes:  improved        Overall control:best it's been in a while; happy with pain regimen that's been established      Activity level/function:        Daily activities:  Able to do all daily activities without complaint      Work:  no     Current morphine equivalents/day = 340-1,020 mg MEDD  Adverse effects: No     Adherance    How often do you take extra pain medicine Never    Did you take your pain medication today? Gabapentin and methadone daily as prescribed, naproxen as needed.  Receives methadone at outside clinic.    Home Exercise? 2-3 days/week for an average of 30-90 minutes  Other treatments         Counseling ?  no - had in the past, hasn't had a counselor in some time         Physical therapy? no - has not had PT in a long time    Risk Factors:      Sleep:  Good      Mood/anxiety:  No problems with mood or anxiety; feels very stable      Recent family or social stressors:  none noted      Other risks: sitting and standing for prolonged periods of time      Alcohol use? No       Nonprescription drug use? No      Database checked today? Yes- no methadone reported in . Suspect methadone from addiction clinic. Need to clarify.      Adherence and Exercise  Medication side effects: no  How often is a medication missed? Never  Exercise: noted above       Asthma Follow-up  -feels like asthma is fairly well-controlled, not bothered frequently by symptoms  -will use albuterol inhaler once or twice a week, maybe  less, maybe more depending on trigger exposure  -triggers include smoking, cold air, dry air  -using 2 puffs of albuterol helps manage occasional symptoms        Problem, Medication and Allergy Lists were reviewed and updated if needed..    Patient is an established patient of this clinic..         Review of Systems:   Review of Systems   Constitutional: Negative for chills, diaphoresis, fatigue, fever and unexpected weight change.   HENT: Negative for congestion, ear discharge, ear pain, facial swelling, rhinorrhea, sinus pressure, sinus pain, sneezing, sore throat and trouble swallowing.    Eyes: Negative for pain, discharge, redness, itching and visual disturbance.   Respiratory: Positive for shortness of breath (when asthma is triggered). Negative for apnea, cough, chest tightness, wheezing and stridor.    Cardiovascular: Negative for chest pain, palpitations and leg swelling.   Gastrointestinal: Negative for abdominal distention, abdominal pain, constipation, diarrhea, nausea and vomiting.   Endocrine: Negative for cold intolerance, heat intolerance, polydipsia, polyphagia and polyuria.   Genitourinary: Negative for difficulty urinating, dysuria, frequency, hematuria and urgency.   Musculoskeletal: Positive for arthralgias (back pain), back pain and myalgias (back pain). Negative for gait problem and joint swelling.   Skin: Negative.    Neurological: Negative for dizziness, tremors, syncope, weakness, light-headedness, numbness and headaches.   Hematological: Negative for adenopathy. Does not bruise/bleed easily.   Psychiatric/Behavioral: Negative for agitation, behavioral problems, confusion, dysphoric mood, self-injury, sleep disturbance and suicidal ideas. The patient is not nervous/anxious and is not hyperactive.              Physical Exam:     Vitals:    11/15/19 1335   BP: (!) 134/91   Pulse: 71   Resp: 16   Temp: 97.5  F (36.4  C)   TempSrc: Oral   SpO2: 96%     There is no height or weight on file to  calculate BMI.  Vital signs normal except diastolic blood pressure elevated (above 91). Recheck BP=     Physical Exam  Constitutional:       General: He is not in acute distress.     Appearance: Normal appearance. He is not ill-appearing.   HENT:      Head: Normocephalic and atraumatic.      Right Ear: Tympanic membrane, ear canal and external ear normal. There is impacted cerumen.      Left Ear: Tympanic membrane, ear canal and external ear normal. There is impacted cerumen.      Nose: Nose normal.      Mouth/Throat:      Mouth: Mucous membranes are moist.      Pharynx: Oropharynx is clear. No oropharyngeal exudate or posterior oropharyngeal erythema.   Eyes:      General: No scleral icterus.        Right eye: No discharge.         Left eye: No discharge.      Extraocular Movements: Extraocular movements intact.      Conjunctiva/sclera: Conjunctivae normal.      Pupils: Pupils are equal, round, and reactive to light.   Neck:      Musculoskeletal: Normal range of motion and neck supple. No neck rigidity or muscular tenderness.   Cardiovascular:      Rate and Rhythm: Normal rate and regular rhythm.      Heart sounds: Normal heart sounds.   Pulmonary:      Effort: Pulmonary effort is normal. No respiratory distress.      Breath sounds: Normal breath sounds. No wheezing, rhonchi or rales.   Chest:      Chest wall: No tenderness.   Abdominal:      General: Abdomen is flat. There is no distension.      Palpations: Abdomen is soft.      Tenderness: There is no abdominal tenderness.   Musculoskeletal: Normal range of motion.         General: No swelling, tenderness or deformity.      Right lower leg: No edema.      Left lower leg: No edema.   Lymphadenopathy:      Cervical: No cervical adenopathy.   Skin:     General: Skin is warm and dry.      Capillary Refill: Capillary refill takes less than 2 seconds.      Findings: No erythema or rash.   Neurological:      General: No focal deficit present.      Mental Status: He is  alert and oriented to person, place, and time.      Cranial Nerves: No cranial nerve deficit.      Sensory: No sensory deficit.      Motor: No weakness.      Coordination: Coordination normal.      Gait: Gait normal.   Psychiatric:         Mood and Affect: Mood normal.         Behavior: Behavior normal.         Thought Content: Thought content normal.         Judgment: Judgment normal.           Results:   No testing ordered today.    Assessment and Plan        1. Chronic pain syndrome  Pain reportedly improved with current pain regimen, Mr. Bates is happy with current level of pain control. He is taking his medications responsibly as prescribed, no current concern for prescription misuse or non-adherence. Provided refill of gabapentin today.  Regarding methadone use, no prescriptions identified via . Unclear as to source of methadone prescription during interview. Will clarify at follow up visit next month.  - gabapentin (NEURONTIN) 400 MG capsule; Take 2 capsules (800 mg) by mouth 3 times daily  Dispense: 180 capsule; Refill: 1  - Consider Narcan at next visit as well    2. Other chronic pain  Refilled today, as he is doing well with current pain management regimen. Recommended he continue to take naproxen only as needed.  - naproxen (NAPROSYN) 375 MG tablet; Take 1 tablet (375 mg) by mouth 2 times daily as needed for moderate pain  Dispense: 30 tablet; Refill: 0    3. Pain in surgical scar  Refilled today, as above.  - naproxen (NAPROSYN) 375 MG tablet; Take 1 tablet (375 mg) by mouth 2 times daily as needed for moderate pain  Dispense: 30 tablet; Refill: 0    4. Health Maintenance  For future care and management, will follow Dr. Sheets as primary.  Completed PHQ9 and GAD7 today, as well as ACT.  Received pneumococcal vaccine today.  Plan to follow up in one month for health maintenance visit, with blood work consisting of HIV screen and lipid panel.       There are no discontinued medications.    Options for  treatment and follow-up care were reviewed with the patient. Vaughn Bates  engaged in the decision making process and verbalized understanding of the options discussed and agreed with the final plan.      Sangeetha Bernal, MS3    Resident/Fellow Attestation   I, Hammad Sheets, was present with the medical student who participated in the service and in the documentation of the note.  I have verified the history and personally performed the physical exam and medical decision making.  I agree with the assessment and plan of care as documented in the note.      Hammad Sheets MD  [facility]  PGY-2, [phone]

## 2019-11-15 NOTE — PROGRESS NOTES
Preceptor Attestation:   Patient seen, evaluated and discussed with the resident. I have verified the content of the note, which accurately reflects my assessment of the patient and the plan of care.   Supervising Physician:  Ned Tobias MD

## 2019-11-16 ASSESSMENT — ANXIETY QUESTIONNAIRES: GAD7 TOTAL SCORE: 0

## 2019-11-16 ASSESSMENT — ASTHMA QUESTIONNAIRES: ACT_TOTALSCORE: 25

## 2020-01-13 ENCOUNTER — TELEPHONE (OUTPATIENT)
Dept: FAMILY MEDICINE | Facility: CLINIC | Age: 40
End: 2020-01-13

## 2020-01-13 DIAGNOSIS — G89.4 CHRONIC PAIN SYNDROME: ICD-10-CM

## 2020-01-13 RX ORDER — GABAPENTIN 400 MG/1
800 CAPSULE ORAL 3 TIMES DAILY
Qty: 180 CAPSULE | Refills: 1 | Status: SHIPPED | OUTPATIENT
Start: 2020-01-13 | End: 2020-03-11

## 2020-01-13 NOTE — TELEPHONE ENCOUNTER
"Request for medication refill: gabapentin (NEURONTIN) 400 MG capsule    Providers if patient needs an appointment and you are willing to give a one month supply please refill for one month and  send a letter/MyChart using \".SMILLIMITEDREFILL\" .smillimited and route chart to \"P SMI \" (Giving one month refill in non controlled medications is strongly recommended before denial)    If refill has been denied, meaning absolutely no refills without visit, please complete the smart phrase \".smirxrefuse\" and route it to the \"P SMI MED REFILLS\"  pool to inform the patient and the pharmacy.    Juhi Mcgovern CMA        "

## 2020-01-14 NOTE — TELEPHONE ENCOUNTER
Patient calling to advise that he was informed by pharmacy staff that they are unable to fill prescription due to insurance if sent by Dr Sheets; may need to be sent by faculty provider. Please re-send.

## 2020-01-14 NOTE — TELEPHONE ENCOUNTER
Patient was last seen in clinic on 11/15/2019, by Nahid/Jatinder. Author called and notified pharmacy to consider Attending faculty Jatinder as Authorizing  provider for Rx- script gabapentin (NEURONTIN) 400 MG capsule, per chart review on 11/15/2019.  Prescription was last ordered by Kortney on 01/13/2020, for billing purpose, pharmacy staff requested cosign/attending faculty.     Message routed to providers as FYI-only.    Jeanne Clark RN

## 2020-01-31 ENCOUNTER — OFFICE VISIT (OUTPATIENT)
Dept: FAMILY MEDICINE | Facility: CLINIC | Age: 40
End: 2020-01-31
Payer: MEDICAID

## 2020-01-31 VITALS
OXYGEN SATURATION: 99 % | DIASTOLIC BLOOD PRESSURE: 92 MMHG | HEART RATE: 99 BPM | TEMPERATURE: 97.2 F | SYSTOLIC BLOOD PRESSURE: 153 MMHG | BODY MASS INDEX: 44.88 KG/M2 | WEIGHT: 315 LBS | RESPIRATION RATE: 16 BRPM

## 2020-01-31 DIAGNOSIS — I10 ESSENTIAL HYPERTENSION: ICD-10-CM

## 2020-01-31 DIAGNOSIS — T88.7XXA MEDICATION SIDE EFFECTS: ICD-10-CM

## 2020-01-31 DIAGNOSIS — Z00.00 ENCOUNTER FOR PREVENTIVE CARE: Primary | ICD-10-CM

## 2020-01-31 LAB
ALBUMIN SERPL-MCNC: 5 MG/DL (ref 3.8–5)
ALP SERPL-CCNC: 104.1 U/L (ref 31.7–110.7)
ALT SERPL-CCNC: 19.8 U/L (ref 0–45)
AST SERPL-CCNC: 10.8 U/L (ref 0–55)
BILIRUB SERPL-MCNC: <0.4 MG/DL (ref 0.2–1.3)
BUN SERPL-MCNC: 10.3 MG/DL (ref 7–21)
CALCIUM SERPL-MCNC: 9.7 MG/DL (ref 8.5–10.1)
CHLORIDE SERPLBLD-SCNC: 101.8 MMOL/L (ref 98–110)
CHOLEST SERPL-MCNC: 178.1 MG/DL (ref 0–200)
CHOLEST/HDLC SERPL: 4 {RATIO} (ref 0–5)
CO2 SERPL-SCNC: 29.6 MMOL/L (ref 20–32)
CREAT SERPL-MCNC: 0.7 MG/DL (ref 0.7–1.3)
GFR SERPL CREATININE-BSD FRML MDRD: >90 ML/MIN/1.7 M2
GLUCOSE SERPL-MCNC: 133.2 MG'DL (ref 70–99)
HDLC SERPL-MCNC: 44.1 MG/DL
HIV 1+2 AB+HIV1 P24 AG SERPL QL IA: NONREACTIVE
LDLC SERPL CALC-MCNC: 76 MG/DL (ref 0–129)
POTASSIUM SERPL-SCNC: 3.8 MMOL/L (ref 3.3–4.5)
PROT SERPL-MCNC: 8.8 G/DL (ref 6.8–8.8)
SODIUM SERPL-SCNC: 139.9 MMOL/L (ref 132.6–141.4)
TRIGL SERPL-MCNC: 292.2 MG/DL (ref 0–150)
VLDL CHOLESTEROL: 58.4 MG/DL (ref 7–32)

## 2020-01-31 RX ORDER — LISINOPRIL 10 MG/1
10 TABLET ORAL DAILY
Qty: 30 TABLET | Refills: 1 | Status: SHIPPED | OUTPATIENT
Start: 2020-01-31 | End: 2020-03-11

## 2020-01-31 NOTE — PATIENT INSTRUCTIONS
Here is the plan from today's visit    1. Encounter for preventive care  You will get these when you come back  - HIV Antigen Antibody Combo  - Lipid Cascade ([facility])    2. Medication side effects  - Comprehensive Metabolic Panel ([facility])    3. Essential hypertension  Start the blood pressure medicine - one a day and come back in 2 weeks for another check.   - lisinopril (PRINIVIL/ZESTRIL) 10 MG tablet; Take 1 tablet (10 mg) by mouth daily  Dispense: 30 tablet; Refill: 1      Please call or return to clinic if your symptoms don't go away.    Follow up plan  2 weeks     Thank you for coming to [facility] today.  Lab Testing:  **If you had lab testing today and your results are reassuring or normal they will be mailed to you or sent through [employer] within 7 days.   **If the lab tests need quick action we will call you with the results.  The phone number we will call with results is # [phone] (home) . If this is not the best number please call our clinic and change the number.  Medication Refills:  If you need any refills please call your pharmacy and they will contact us.   If you need to  your refill at a new pharmacy, please contact the new pharmacy directly. The new pharmacy will help you get your medications transferred faster.   Scheduling:  If you have any concerns about today's visit or wish to schedule another appointment please call our office during normal business hours [phone] (8-5:00 M-F)  If a referral was made to a [facility] and you don't get a call from central scheduling please call [phone].  If a Mammogram was ordered for you at [facility] call [phone] to schedule or change your appointment.  If you had an XRay/CT/Ultrasound/MRI ordered the number is [phone] to schedule or change your radiology appointment.   Medical Concerns:  If you have urgent medical concerns please call [phone] at any time of the day.    Ethel Martin  MD

## 2020-01-31 NOTE — PROGRESS NOTES
ABDI Mendes is a 39 year old  male  who presents for follow up of concern(s) listed below:    Chief Complaint   Patient presents with     Recheck Medication     STI Check     PCP wanting lipids and HIV - placed today. Also CMP.   Denies concern for HIV.  One partner, and no outside partners.      Here for that only.    HTN:  Not aware if there is high blood pressure - mother OK. Father passed away so not sure. Siblings are the oldest.      Dental:  Does not like the dentist. Not bothering him so not interested in dental care.       Patient Active Problem List   Diagnosis     Other chronic pain     Insomnia     Opioid type dependence (H)     Health Care Home     Back pain     Intermittent asthma     Pain in surgical scar     Allodynia     Abnormal weight gain       Current Outpatient Medications   Medication Sig Dispense Refill     albuterol (PROVENTIL HFA) 108 (90 Base) MCG/ACT inhaler Inhale 2 puffs into the lungs every 6 hours 1 Inhaler 3     gabapentin (NEURONTIN) 400 MG capsule Take 2 capsules (800 mg) by mouth 3 times daily 180 capsule 1     methadone HCl 10 MG/5ML SOLN Take 42.5 mLs (85 mg) by mouth daily  0     naproxen (NAPROSYN) 375 MG tablet Take 1 tablet (375 mg) by mouth 2 times daily as needed for moderate pain 30 tablet 0        No Known Allergies             Review of Systems:     Denies CP, SOB, edema, visual changes or headache            Physical Exam:     Vitals:    01/31/20 1039 01/31/20 1041   BP: (!) 167/113 (!) 153/92   Pulse: 99    Resp: 16    Temp: 97.2  F (36.2  C)    TempSrc: Oral    SpO2: 99%    Weight: 144.6 kg (318 lb 12.8 oz)      Body mass index is 44.88 kg/m .  Vital signs normal except BP  GENERAL: healthy, alert, well nourished, well hydrated, no distress  RESP: lungs clear to auscultation - no rales, no rhonchi, no wheezes  CV: regular rates and rhythm, normal S1 S2, no S3 or S4 and no murmur, no click or rub -  MS: extremities- no gross deformities noted, no  edema    No results found for any visits on 01/31/20.       Assessment and Plan     Vaughn was seen today for recheck medication.    Diagnoses and all orders for this visit:    Encounter for preventive care -   -     HIV Antigen Antibody Combo  -     Lipid Cascade ([facility])    Medication side effects - has not had BMP in awhile and likely has HTN. Also, on gabapentin, so checking LFT.   -     Comprehensive Metabolic Panel ([facility])    Essential hypertension - reviewed that he has had lots of elevated BPs in the past and so will start on lisinopril, return in 2 weeks for recheck of BP and BMP at that time.  Did not review other changes such as salt restriction and veg/fruit in diet.  Recommend doing so at follow up visit. Encouraged him to get his teeth cared for but he is not interested at this time.   -     lisinopril (PRINIVIL/ZESTRIL) 10 MG tablet; Take 1 tablet (10 mg) by mouth daily        There are no discontinued medications.          Options for treatment and follow-up care were reviewed with the patient . Vaughn Bates  engaged in the decision making process and verbalized understanding of the options discussed and agreed with the final plan.    Ethel Martin MD

## 2020-01-31 NOTE — LETTER
February 2, 2020      Vaughn Bates  [address]  [place] [address]        Dear Vaughn,    Thank you for getting your care at [facility]. Please see below for your test results.  Your tests were fine. No HIV. Cholesterol is OK but there is a lot of fat in your blood, so working to eat less fat and less starches and sugars would be a good thing for your health.     Resulted Orders   HIV Antigen Antibody Combo   Result Value Ref Range    HIV Antigen Antibody Combo Nonreactive NR^Nonreactive          Comment:      HIV-1 p24 Ag & HIV-1/HIV-2 Ab Not Detected   Lipid Cascade ([facility])   Result Value Ref Range    Cholesterol 178.1 0.0 - 200.0 mg/dL    Cholesterol/HDL Ratio 4.0 0.0 - 5.0    HDL Cholesterol 44.1 >40.0 mg/dL    Triglycerides 292.2 (H) 0.0 - 150.0 mg/dL    VLDL Cholesterol 58.4 (H) 7.0 - 32.0 mg/dL    LDL Cholesterol Calculated 76 0 - 129 mg/dL   Comprehensive Metabolic Panel ([facility])   Result Value Ref Range    Calcium 9.7 8.5 - 10.1 mg/dL    Chloride 101.8 98.0 - 110.0 mmol/L    Carbon Dioxide 29.6 20.0 - 32.0 mmol/L    Creatinine 0.7 (L) 0.7 - 1.3 mg/dL    Glucose 133.2 (H) 70.0 - 99.0 mg'dL    Potassium 3.8 3.3 - 4.5 mmol/L    Sodium 139.9 132.6 - 141.4 mmol/L    Protein Total 8.8 (H) 6.8 - 8.8 g/dL    GFR Estimate >90 >60.0 mL/min/1.7 m2    GFR Estimate If Black >90 >60.0 mL/min/1.7 m2    Albumin 5.0 3.8 - 5.0 mg/dL    Alkaline Phosphatase 104.1 31.7 - 110.7 U/L    ALT 19.8 0.0 - 45.0 U/L    AST 10.8 0.0 - 55.0 U/L    Bilirubin Total <0.4 0.2 - 1.3 mg/dL    Urea Nitrogen 10.3 7.0 - 21.0 mg/dL       If you have any concerns about these results please call and leave a message for me or send a prolliet message to the clinic.    Sincerely,    Ethel Martin MD

## 2020-03-11 ENCOUNTER — OFFICE VISIT (OUTPATIENT)
Dept: FAMILY MEDICINE | Facility: CLINIC | Age: 40
End: 2020-03-11
Payer: MEDICAID

## 2020-03-11 VITALS
TEMPERATURE: 97.7 F | HEART RATE: 80 BPM | OXYGEN SATURATION: 98 % | BODY MASS INDEX: 42.7 KG/M2 | DIASTOLIC BLOOD PRESSURE: 88 MMHG | WEIGHT: 305 LBS | SYSTOLIC BLOOD PRESSURE: 133 MMHG | HEIGHT: 71 IN | RESPIRATION RATE: 18 BRPM

## 2020-03-11 DIAGNOSIS — G89.4 CHRONIC PAIN SYNDROME: ICD-10-CM

## 2020-03-11 DIAGNOSIS — I10 ESSENTIAL HYPERTENSION: Primary | ICD-10-CM

## 2020-03-11 LAB
BUN SERPL-MCNC: 8.1 MG/DL (ref 7–21)
CALCIUM SERPL-MCNC: 9.2 MG/DL (ref 8.5–10.1)
CHLORIDE SERPLBLD-SCNC: 100.1 MMOL/L (ref 98–110)
CO2 SERPL-SCNC: 28.3 MMOL/L (ref 20–32)
CREAT SERPL-MCNC: 0.6 MG/DL (ref 0.7–1.3)
GFR SERPL CREATININE-BSD FRML MDRD: >90 ML/MIN/1.7 M2
GLUCOSE SERPL-MCNC: 163.8 MG'DL (ref 70–99)
HBA1C MFR BLD: 5.7 % (ref 4.1–5.7)
POTASSIUM SERPL-SCNC: 3.6 MMOL/L (ref 3.3–4.5)
SODIUM SERPL-SCNC: 134.2 MMOL/L (ref 132.6–141.4)

## 2020-03-11 RX ORDER — CHLORTHALIDONE 25 MG/1
25 TABLET ORAL DAILY
Qty: 30 TABLET | Refills: 0 | Status: SHIPPED | OUTPATIENT
Start: 2020-03-11 | End: 2020-06-25

## 2020-03-11 RX ORDER — GABAPENTIN 400 MG/1
800 CAPSULE ORAL 3 TIMES DAILY
Qty: 180 CAPSULE | Refills: 1 | Status: CANCELLED | OUTPATIENT
Start: 2020-03-11

## 2020-03-11 RX ORDER — GABAPENTIN 400 MG/1
800 CAPSULE ORAL 3 TIMES DAILY
Qty: 180 CAPSULE | Refills: 3 | Status: SHIPPED | OUTPATIENT
Start: 2020-03-11 | End: 2020-06-23

## 2020-03-11 ASSESSMENT — ENCOUNTER SYMPTOMS
ABDOMINAL PAIN: 0
HEADACHES: 0
NAUSEA: 0
ARTHRALGIAS: 0
DIARRHEA: 0
UNEXPECTED WEIGHT CHANGE: 0
DIFFICULTY URINATING: 0
COUGH: 0
APPETITE CHANGE: 0
ABDOMINAL DISTENTION: 0
BACK PAIN: 0
NECK PAIN: 0
MYALGIAS: 0
FEVER: 0
SHORTNESS OF BREATH: 0
CHILLS: 0

## 2020-03-11 ASSESSMENT — MIFFLIN-ST. JEOR: SCORE: 2328.47

## 2020-03-11 NOTE — PROGRESS NOTES
"       HPI       Vaughn aBtes is a 39 year old  who presents for   Chief Complaint   Patient presents with     Recheck Medication     Lisinopril, pt states to not have taken medication due to it causing his left leg to feel numbness   Had numbness in both legs from knees down on lisinopril. Took 2 weeks. Stopped couple of days and numbness went away. Resumed it and numbness came right back. Has been off now for 5 days     Weight - has struggled with it most life. No active gym membership. Does go on an aellipitical at friends apartment sometimes. Able to do this with his chronic pain.       DM2 family hx of it. No symptoms of it. Unknown on hx of heart disease.       Needs refill on gabapentin. Helps make methadone work well enough for chronic back pain. Takes 3 times adaily and doesn't miss doses. No dizziness on it tolerates well.   No chest pain, SOB Problem, Medication and Allergy Lists were reviewed and updated if needed..    Patient is an established patient of this clinic..         Review of Systems:   Review of Systems   Constitutional: Negative for appetite change, chills, fever and unexpected weight change.   Respiratory: Negative for cough and shortness of breath.    Cardiovascular: Negative for chest pain.   Gastrointestinal: Negative for abdominal distention, abdominal pain, diarrhea and nausea.   Genitourinary: Negative for decreased urine volume and difficulty urinating.   Musculoskeletal: Negative for arthralgias, back pain, myalgias and neck pain.   Neurological: Negative for headaches.            Physical Exam:     Vitals:    03/11/20 1637 03/11/20 1638   BP: (!) 145/85 133/88   Pulse: 80    Resp: 18    Temp: 97.7  F (36.5  C)    TempSrc: Oral    SpO2: 98%    Weight: 138.3 kg (305 lb)    Height: 1.8 m (5' 10.87\")      Body mass index is 42.7 kg/m .  Vitals were reviewed and were normal     Physical Exam  Constitutional:       General: He is not in acute distress.     Appearance: He is " well-developed.   HENT:      Head: Normocephalic and atraumatic.   Eyes:      General:         Right eye: No discharge.         Left eye: No discharge.      Conjunctiva/sclera: Conjunctivae normal.   Cardiovascular:      Rate and Rhythm: Normal rate and regular rhythm.      Heart sounds: No murmur.   Pulmonary:      Effort: Pulmonary effort is normal. No respiratory distress.      Breath sounds: No rales.   Chest:      Chest wall: No tenderness.   Abdominal:      Comments: obese   Musculoskeletal:         General: No deformity.   Skin:     Coloration: Skin is not pale.      Findings: No erythema or rash.      Comments: Mild acanthosis bilateral axillae   Neurological:      Mental Status: He is alert and oriented to person, place, and time.      Coordination: Coordination normal.           Results:   Results are ordered and pending    Assessment and Plan        1. Essential hypertension  Screening for DM2. Stopped acei due to intolerance; added chlorthalidone. Will have BP checked at methadone clinic. If stable under 140 return in 3 months to discuss diet/exercise changes. If over 140 follow in 1 month. Would not rule out ARB in patient given unusual side effect.    Will also check a1c today due to family hx and obesity. Didn't check lipid today (had blood drawn for BMP prior andthey couldn't add on; patient didn't want to get re stuck). Would get at next visit.   - Basic Metabolic Panel ([facility])  - chlorthalidone (HYGROTON) 25 MG tablet; Take 1 tablet (25 mg) by mouth daily  Dispense: 30 tablet; Refill: 0  - Hemoglobin A1c ([facility])    2. Chronic pain syndrome  Stable; refilled   - gabapentin (NEURONTIN) 400 MG capsule; Take 2 capsules (800 mg) by mouth 3 times daily  Dispense: 180 capsule; Refill: 3       There are no discontinued medications.    Options for treatment and follow-up care were reviewed with the patient. Vaughn Bates  engaged in the decision making process and verbalized understanding of the  options discussed and agreed with the final plan.    Robert Lacey MD

## 2020-03-11 NOTE — TELEPHONE ENCOUNTER

## 2020-03-11 NOTE — PATIENT INSTRUCTIONS
1. If okay with methadone clinic check BP at every visit.     For first couple of days take in morning and then try to take it at night. If you are getting up to pee then try at night time. If getting up to pee though it is okay to take in AM.     - You to start looking at labels of things you are eating home and try and make sure you have less than 3 grams of salt a day.     If your BP are under 140 come in in 3 months for a regular physical with me or Dr. Sheets. If your BP are still still over 140 come back in 1 month.     Thank you for coming to [facility].    **If you had lab testing today and your results are reassuring or normal they will be be mailed to you or sent to your MyChart and you will be notified by email within 7 days.   **If the lab tests need quick action we will call you with the results.  The phone number we will call with results is # [phone] (home)    (home) . If this is not the best number please call our clinic and change the number.  **If any referrals were ordered today you should be getting a call in the next week.  If you don't hear about this within a week please call one of the following numbers   If your referral is for [facility] please call [phone]  If your referral is to outside [facility] please call the clinic number ([phone]) and ask for your team care coordinator.  If you need any refills please call your pharmacy and they will contact us.  If you have any concerns about today's visit or wish to schedule another appointment  please call our office during normal business hours  [phone] (8-5:00 M-F)  If you have urgent medical concerns please call [phone] at any time of the day.  If you have a medical emergency please call [phone]    Again thank you for choosing [facility] and please let us know how we can best partner with you to improve your and your family's health.

## 2020-03-11 NOTE — LETTER
March 11, 2020      Vaughn Bates  [address]  [facility] [address]        Dear ,    We are writing to inform you of your test results.    Most of your labs look great. You are in the pre diabetic range. I would encourage you to work on eating healthy and exercising 3-5 times a week 30 minutes a session. If you have any questions on specific diets, how to lose weight safely, or proper exercise please make an appointment with us and we are happy to help!     Resulted Orders   Basic Metabolic Panel ([facility])   Result Value Ref Range    Calcium 9.2 8.5 - 10.1 mg/dL    Chloride 100.1 98.0 - 110.0 mmol/L    Carbon Dioxide 28.3 20.0 - 32.0 mmol/L    Creatinine 0.6 (L) 0.7 - 1.3 mg/dL    Glucose 163.8 (H) 70.0 - 99.0 mg'dL    Potassium 3.6 3.3 - 4.5 mmol/L    Sodium 134.2 132.6 - 141.4 mmol/L    GFR Estimate >90 >60.0 mL/min/1.7 m2    GFR Estimate If Black >90 >60.0 mL/min/1.7 m2    Urea Nitrogen 8.1 7.0 - 21.0 mg/dL   Hemoglobin A1c ([facility])   Result Value Ref Range    Hemoglobin A1C 5.7 4.1 - 5.7 %       If you have any questions or concerns, please call the clinic at the number listed above.       Sincerely,    Robert Lacey MD

## 2020-03-11 NOTE — PROGRESS NOTES
Preceptor Attestation:   Patient seen, evaluated and discussed with the resident. I have verified the content of the note, which accurately reflects my assessment of the patient and the plan of care.   Supervising Physician:  Criselda Coffey MD.

## 2020-03-16 ENCOUNTER — TELEPHONE (OUTPATIENT)
Dept: FAMILY MEDICINE | Facility: CLINIC | Age: 40
End: 2020-03-16

## 2020-03-16 DIAGNOSIS — I10 ESSENTIAL HYPERTENSION: Primary | ICD-10-CM

## 2020-03-16 NOTE — TELEPHONE ENCOUNTER
Verify that the refill encounter hasn't been started Yes    [facility] phone call message- patient requesting a refill:    Full Medication Name: see below    Dose: see below     Pharmacy confirmed as   [id number] - [place], [place] - [address] AT [facility] & [address] [address]  Phone: [phone] Fax: [phone]  : Yes    Medication tab checked to see if medication has been sent  Yes    Additional Comments: Patient requesting prescription for BP cuff.     OK to leave a message on voice mail? Yes    Advised patient refill may take up to 2 business days? No    Primary language: English      needed? No    Call taken on March 16, 2020 at 10:48 AM by Esther Flores to [facility]

## 2020-03-16 NOTE — TELEPHONE ENCOUNTER
Request for DME order for Blood monitoring device routed to to PCP to address/advise if appropriate.    Jeanne Clark RN

## 2020-05-11 ENCOUNTER — MEDICAL CORRESPONDENCE (OUTPATIENT)
Dept: HEALTH INFORMATION MANAGEMENT | Facility: CLINIC | Age: 40
End: 2020-05-11

## 2020-06-22 DIAGNOSIS — G89.4 CHRONIC PAIN SYNDROME: ICD-10-CM

## 2020-06-22 NOTE — TELEPHONE ENCOUNTER
"Request for medication refill: gabapentin (NEURONTIN) 400 MG capsule     Providers if patient needs an appointment and you are willing to give a one month supply please refill for one month and  send a letter/MyChart using \".SMILLIMITEDREFILL\" .smillimited and route chart to \"[facility] \" (Giving one month refill in non controlled medications is strongly recommended before denial)    If refill has been denied, meaning absolutely no refills without visit, please complete the smart phrase \".smirxrefuse\" and route it to the \"[facility]\"  pool to inform the patient and the pharmacy.    Ebonie Reilly MA        "

## 2020-06-23 RX ORDER — GABAPENTIN 400 MG/1
800 CAPSULE ORAL 3 TIMES DAILY
Qty: 180 CAPSULE | Refills: 3 | Status: SHIPPED | OUTPATIENT
Start: 2020-06-23 | End: 2020-06-24

## 2020-06-24 ENCOUNTER — TELEPHONE (OUTPATIENT)
Dept: FAMILY MEDICINE | Facility: CLINIC | Age: 40
End: 2020-06-24

## 2020-06-24 DIAGNOSIS — G89.4 CHRONIC PAIN SYNDROME: ICD-10-CM

## 2020-06-24 RX ORDER — GABAPENTIN 400 MG/1
800 CAPSULE ORAL 3 TIMES DAILY
Qty: 180 CAPSULE | Refills: 3 | Status: SHIPPED | OUTPATIENT
Start: 2020-06-24 | End: 2020-06-25

## 2020-06-24 NOTE — TELEPHONE ENCOUNTER
Patient called stating pharmacy will not process the refill because a doctor did not sent it. Please resend

## 2020-06-24 NOTE — TELEPHONE ENCOUNTER
[facility] phone call message- medication clarification/question:    Full Medication Name: gabapentin (NEURONTIN) 400 MG capsule        Dose: Take 2 capsules (800 mg) by mouth 3 times daily - Oral     Question/Clarification needed: Patient stated that the medication listed above is not covered with the prescribing provide, therefore requesting a different provider write a new prescription and send it to the pharmacy.     Pharmacy confirmed as   [id number] - [place], [place] - [address] AT [facility] & [address] [place] [address]  Phone: [phone] Fax: [phone]  : Yes    Please leave ONLY preferred pharmacy    OK to leave a message on voice mail? Yes    Advised patient that RN would call back within 3 hours, unless emergent.    Primary language: English      needed? No    Call taken on June 24, 2020 at 10:05 AM by Jaclyn Flores to [facility]

## 2020-06-25 ENCOUNTER — VIRTUAL VISIT (OUTPATIENT)
Dept: FAMILY MEDICINE | Facility: CLINIC | Age: 40
End: 2020-06-25
Payer: MEDICAID

## 2020-06-25 DIAGNOSIS — I10 ESSENTIAL HYPERTENSION: ICD-10-CM

## 2020-06-25 DIAGNOSIS — G89.4 CHRONIC PAIN SYNDROME: ICD-10-CM

## 2020-06-25 RX ORDER — GABAPENTIN 400 MG/1
800 CAPSULE ORAL 3 TIMES DAILY
Qty: 180 CAPSULE | Refills: 11 | Status: SHIPPED | OUTPATIENT
Start: 2020-06-25 | End: 2021-04-29

## 2020-06-25 RX ORDER — CHLORTHALIDONE 25 MG/1
25 TABLET ORAL DAILY
Qty: 30 TABLET | Refills: 5 | Status: SHIPPED | OUTPATIENT
Start: 2020-06-25 | End: 2021-09-28

## 2020-06-25 NOTE — PROGRESS NOTES
"Family Medicine Telephone Visit Note         Telephone Visit Consent   Patient was verbally read the following and verbal consent was obtained.    \"Telephone visits are billed at different rates depending on your insurance coverage. During this emergency period, for some insurers they may be billed the same as an in-person visit.  Please reach out to your insurance provider with any questions.  If during the course of the call the physician/provider feels a telephone visit is not appropriate, you will not be charged for this service.\"    Name person giving consent:  Patient   Date verbal consent given:  6/25/2020  Time verbal consent given:  2:17 PM    Chief Complaint   Patient presents with     Recheck Medication     update on Gabapentin- needs refills            HPI   Patients name: Vaughn  Appointment start time:  2:39 PM    Requesting a longer term refill of gabapentin. Has been on it for about 6 years for now and not having any side effects. Taking it for back pain and his back pain is doing really well recently. Still doing methadone at [facility] in [place]. Moving up to [place] now, from [place].    Checking BP regularly at Methadone clinic. Nurses who take his BP say it has been good, but he doesn't remember the numbers exactly. He denies any side effects with the Chlorthalidone.      Current Outpatient Medications   Medication Sig Dispense Refill     albuterol (PROVENTIL HFA) 108 (90 Base) MCG/ACT inhaler Inhale 2 puffs into the lungs every 6 hours 1 Inhaler 3     chlorthalidone (HYGROTON) 25 MG tablet Take 1 tablet (25 mg) by mouth daily 30 tablet 0     gabapentin (NEURONTIN) 400 MG capsule Take 2 capsules (800 mg) by mouth 3 times daily 180 capsule 3     methadone HCl 10 MG/5ML SOLN Take 42.5 mLs (85 mg) by mouth daily  0     naproxen (NAPROSYN) 375 MG tablet Take 1 tablet (375 mg) by mouth 2 times daily as needed for moderate pain 30 tablet 0     order for DME Equipment being ordered: Digital " "home blood pressure monitor kit 1 Device 0     No Known Allergies           Review of Systems:     Constitutional, HEENT, cardiovascular, pulmonary, gi and gu systems are negative, except as otherwise noted.         Physical Exam:     There were no vitals taken for this visit.  Estimated body mass index is 42.7 kg/m  as calculated from the following:    Height as of 3/11/20: 1.8 m (5' 10.87\").    Weight as of 3/11/20: 138.3 kg (305 lb).    Exam:  Constitutional: healthy, alert and no distress  Psychiatric: mentation appears normal and affect normal/bright    Results from last visit:  Office Visit on 03/11/2020   Component Date Value Ref Range Status     Calcium 03/11/2020 9.2  8.5 - 10.1 mg/dL Final     Chloride 03/11/2020 100.1  98.0 - 110.0 mmol/L Final     Carbon Dioxide 03/11/2020 28.3  20.0 - 32.0 mmol/L Final     Creatinine 03/11/2020 0.6* 0.7 - 1.3 mg/dL Final     Glucose 03/11/2020 163.8* 70.0 - 99.0 mg'dL Final     Potassium 03/11/2020 3.6  3.3 - 4.5 mmol/L Final     Sodium 03/11/2020 134.2  132.6 - 141.4 mmol/L Final     GFR Estimate 03/11/2020 >90  >60.0 mL/min/1.7 m2 Final     GFR Estimate If Black 03/11/2020 >90  >60.0 mL/min/1.7 m2 Final     Urea Nitrogen 03/11/2020 8.1  7.0 - 21.0 mg/dL Final     Hemoglobin A1C 03/11/2020 5.7  4.1 - 5.7 % Final           Assessment and Plan   Vaughn was seen today for recheck medication.    Diagnoses and all orders for this visit:    Chronic pain syndrome  Doing well! Back pain well controlled. Has been on gabapentin for 6 years now without issue. Safe to do longer term refill at this point, so will give him a year's worth of medications. Should check back in at that time regarding back pain.  -     gabapentin (NEURONTIN) 400 MG capsule; Take 2 capsules (800 mg) by mouth 3 times daily    Essential hypertension  BP reportedly well controlled at methadone clinic, and was within normal limits at last office visit here. Denies side effects with chlorthalidone. Last BMP 3 " months ago with potassium within normal limits. Will refill for 6 months, should probably have repeat BMP done at that time.  -     chlorthalidone (HYGROTON) 25 MG tablet; Take 1 tablet (25 mg) by mouth daily      Refilled medications that would be required in the next 3 months.     After Visit Information:  Patient declined AVS     No follow-ups on file.    Appointment end time: 2:46 PM  This is a telephone visit that took 7 minutes.      Clinician location:  [facility]    Jake Sheets MD  I precepted today with Dr. Orellana.

## 2020-06-25 NOTE — PROGRESS NOTES
Preceptor Attestation:   I talked to the patient on the phone. I discussed the patient with the resident. I have verified the content of the note, which accurately reflects my assessment of the patient and the plan of care.   Supervising Physician:  Alli Orellana MD.

## 2020-11-11 ENCOUNTER — HISTORICAL (OUTPATIENT)
Dept: ADMINISTRATIVE | Facility: HOSPITAL | Age: 40
End: 2020-11-11

## 2020-11-11 LAB — SARS-COV+SARS-COV-2 AG RESP QL IA.RAPID: NEGATIVE

## 2021-04-06 ENCOUNTER — DOCUMENTATION ONLY (OUTPATIENT)
Dept: FAMILY MEDICINE | Facility: CLINIC | Age: 41
End: 2021-04-06

## 2021-04-06 ENCOUNTER — OFFICE VISIT (OUTPATIENT)
Dept: FAMILY MEDICINE | Facility: CLINIC | Age: 41
End: 2021-04-06
Payer: MEDICAID

## 2021-04-06 ENCOUNTER — MEDICAL CORRESPONDENCE (OUTPATIENT)
Dept: HEALTH INFORMATION MANAGEMENT | Facility: CLINIC | Age: 41
End: 2021-04-06

## 2021-04-06 VITALS
DIASTOLIC BLOOD PRESSURE: 84 MMHG | BODY MASS INDEX: 46.82 KG/M2 | WEIGHT: 315 LBS | SYSTOLIC BLOOD PRESSURE: 135 MMHG | OXYGEN SATURATION: 97 % | HEART RATE: 78 BPM | TEMPERATURE: 97.8 F | RESPIRATION RATE: 18 BRPM

## 2021-04-06 DIAGNOSIS — I10 ESSENTIAL HYPERTENSION: ICD-10-CM

## 2021-04-06 DIAGNOSIS — G56.03 BILATERAL CARPAL TUNNEL SYNDROME: Primary | ICD-10-CM

## 2021-04-06 PROCEDURE — 99213 OFFICE O/P EST LOW 20 MIN: CPT | Mod: GC | Performed by: STUDENT IN AN ORGANIZED HEALTH CARE EDUCATION/TRAINING PROGRAM

## 2021-04-06 NOTE — PATIENT INSTRUCTIONS
Patient Education   Here is the plan from today's visit    1. Bilateral carpal tunnel syndrome  You have a nerve that goes through the wrist and can become entrapped and irritated causing the numbness, tingling, and weakness you're experiencing. Wearing wrist splints can be helpful at preventing nerve entrapment. Try using these splints every night when you sleep and see if your symptoms improve. You can also wear them during the day if you wish and see if this is helpful but it is most important that you wear them at night.       Please call or return to clinic if your symptoms don't improve in the next 1-2 months.    Follow up plan  No follow-ups on file.    Thank you for coming to [facility] today.  Lab Testing:  **If you had lab testing today and your results are reassuring or normal they will be mailed to you or sent through [employer] within 7 days.   **If the lab tests need quick action we will call you with the results.  **If you are having labs done on a different day, please call [phone] to schedule at [facility] or [phone] for other [facility] locations.   The phone number we will call with results is # [phone] (home) . If this is not the best number please call our clinic and change the number.  Medication Refills:  If you need any refills please call your pharmacy and they will contact us.   If you need to  your refill at a new pharmacy, please contact the new pharmacy directly. The new pharmacy will help you get your medications transferred faster.   Scheduling:  If you have any concerns about today's visit or wish to schedule another appointment please call our office during normal business hours [phone] (8-5:00 M-F)  If a referral was made to a [facility] and you don't get a call from central scheduling please call [phone].  If a Mammogram was ordered for you at [facility] call [phone] to schedule or change your  "appointment.  If you had an XRay/CT/Ultrasound/MRI ordered the number is [phone] to schedule or change your radiology appointment.   Medical Concerns:  If you have urgent medical concerns please call [phone] at any time of the day.    Margaret Mcbride MD      Information on carpal tunnel syndrome:    What is carpal tunnel syndrome? Carpal tunnel syndrome is a condition that causes pain and numbness in the fingers and hands, and sometimes the arms. It happens when a nerve in the wrist called the \"median nerve\" gets pinched or squeezed.  The median nerve goes through a tunnel in the wrist. This tunnel is formed by the bones of the wrist and a tough band of tissue called a \"ligament\" (figure 1). Experts do not know exactly how the nerve can get pinched. But they think it might happen when:  ?Tendons that go through the same tunnel get swollen (tendons are bands of tissue that connect muscles to bones)  ?Tissues surrounding the tendons harden or get swollen  ?People use their hands for work that involves repetitive or forceful movements    The median nerve carries signals about sensation. In other words, it tells the brain what the hand is feeling. The nerve gets information from these parts of the hand:  ?Thumb  ?Index finger  ?Middle finger  ?Parts of the ring finger  ?Parts of the palm closest to the thumb    Women are more likely than men to get carpal tunnel syndrome. Being overweight probably increases the risk of carpal tunnel syndrome. Certain health conditions also might increase the risk, including diabetes and rheumatoid arthritis. Women who are pregnant are also more likely to get carpal tunnel syndrome, but it usually goes away after the baby is born.  What are the symptoms of carpal tunnel syndrome?The symptoms include pain and tingling in the thumb and the index, middle, and ring fingers (figure 1). Symptoms are typically worst at night and can wake you up from sleep. Often the symptoms affect " both hands, but one hand might have worse symptoms than the other.    In some cases, pain and tingling can extend to the whole hand or even up to the wrist and forearm. Rarely, pain and tingling extends past the elbow to the shoulder.    The symptoms can also flare up when you do things that involve bending and unbending your wrist or raising your arms. Some activities can trigger symptoms in people with carpal tunnel syndrome. But they do not actually cause the condition. Examples include:  ?Driving  ?Reading  ?Typing  ?Holding a phone    In many people, symptoms come and go. But some people eventually have symptoms all the time. They can end up having trouble moving their fingers or controlling their .  Is there a test for carpal tunnel syndrome?Yes. Electrical tests of the nerves can show if you have carpal tunnel syndrome, but these tests are not always necessary.    Your doctor will probably be able to tell if you have carpal tunnel syndrome by learning about your symptoms and doing an exam. During the exam, he or she might tap on or press on your wrist, or ask you to hold your hands in ways that are known to make symptoms worse.    Your doctor might also order electrical nerve tests. These tests can confirm that you have carpal tunnel syndrome. They include:  ?Nerve conduction studies - Nerve conduction studies can show whether the median nerve is carrying electrical signals the right way. In people with carpal tunnel syndrome, signals can be slow or weak.  ?Electromyography - Electromyography, also called EMG, can show whether the muscles in the hand and wrist are responding the right way to electrical signals. This test is most useful in figuring out if symptoms are related to carpal tunnel syndrome or another problem.  Should I see a doctor or nurse?Yes. See your doctor or nurse if you have the symptoms described above, and they bother you.  How is carpal tunnel syndrome treated?Treatments are often  "combined and can include:  ?Wrist splints - Some people feel better if they wear splints at night that keep their hands in a \"neutral position.\" The neutral position is when the wrist is not bent forward or backward and the fingers are curled naturally toward the palm.  Doctors often suggest splints for women who get carpal tunnel syndrome during pregnancy. They usually don't need other treatments, since in most cases, symptoms improve after the baby is born.  ?Steroid shots or pills - Steroids are a group of medicines that control inflammation and swelling. To treat carpal tunnel syndrome, doctors sometimes inject steroids into the carpal tunnel. People who do not want to get a shot can take steroids in pill form instead. But the pills are less effective than the shot.  ?Other physical treatments - There is weak evidence that yoga or another treatment called \"carpal bone mobilization\" might help some people with carpal tunnel syndrome. For carpal bone mobilization, a physical or occupational therapist moves the bones in your wrist around in a special way.  ?Surgery - Doctors offer surgery to people who have ongoing or severe nerve damage that is causing the symptoms of carpal tunnel syndrome. Surgery for carpal tunnel syndrome involves cutting the ligament that stretches across the wrist to form the tunnel.    Can carpal tunnel syndrome be prevented? It's unclear whether there is any way to prevent carpal tunnel syndrome. People sometimes think that the condition happens because they use a computer too much. But studies have shown that computer use is probably not related to carpal tunnel syndrome.      "

## 2021-04-06 NOTE — PROGRESS NOTES
"    Assessment & Plan     Bilateral carpal tunnel syndrome  History and symptoms consistent with carpal tunnel syndrome, however specific tests for carpal tunnel (Phalen maneuver and Tinel test) were negative today. Vaughn has previously used wrist splints which he found to be helpful and reducing the frequency of his symptoms, however he lost these about 6  Months ago. Recommend he start wearing wrist splints (provided in clinic today) on both wrists every night while sleeping, and if he is having more daytime symptoms as well he could continue to wear them duringthe day. He can also use ibuprofen PRN for pain/inflammation control. If symptoms do not improve a next step to consider could be a steroid injection to help reduce inflammation. These have shown to be beneficial at short term improvement of symptoms compared to splinting alone, however effects at 6 months are similar to improvement with use of wrist splints alone.   - UNIVERSAL WRIST SPLINTS WITH THUMB  - UNIVERSAL WRIST SPLINTS WITH THUMB    Essential Hypertension  Discussed Vaughn's blood pressure today and encouraged him to continue taking his prescribed blood pressure medications (chlorthalidone).     Documentation  Vaughn inquired about a work form today but it appears this form has not yet been received by the clinic. I did fill out a transportation form and will have it faxed back to his . I discussed the work form with Vaughn and explained that it will likely be that we fill it out with appropriate restrictions based on his limited mobility at this time, rather than a complete restriction (such as \"unable to work\").     Tobacco Cessation:   reports that he has been smoking cigarettes. He has been smoking about 0.50 packs per day. He has never used smokeless tobacco.  Tobacco Cessation Action Plan: Information offered: Patient not interested at this time    See Patient Instructions    No follow-ups on file.    Margaret Mcbride MD  [facility] " "[facility] FARHAN Mendes is a 40 year old who presents for the following health issues:    Has a \"Medical Opinion Form\" that would like to fill out saying he cannot work because of hand pain. Does mainly manual labor    Hand pain/numbness in his bilateral hands. This started several years ago (2016 or 2017) and was gradual in onset intermittent occurring most often at night and would only last for a few minutes. It has worsened over the past 2-3 months to point that he has numbness most of the day. Having weakness as well. \"Gets to the point that if I'm holding change I won't notice I've dropped it until I go to look for it.\" Notices symptoms in all ten fingers, dorsal and ventral sides, palms and backs of hands up to his wrists.     Has previously used wrist braces for carpal tunnel, which were helpful, \"wasn't so bad\" but still some symptoms. Was using braces for about 6 months but has since lost them.     If he has symptoms upon waking they will last throughout the day, but if he awakens and he doesn't have symptoms he tends to be ok that day. Generally does not go more than 3 days in a row without symptoms.       Was prescribed medication for HTN and is not currently taking.          Review of Systems   Constitutional, HEENT, cardiovascular, pulmonary, gi and gu systems are negative, except as otherwise noted.      Objective    /84 (BP Location: Left arm, Patient Position: Sitting, Cuff Size: Adult Large)   Pulse 78   Temp 97.8  F (36.6  C) (Oral)   Resp 18   Wt (!) 151.7 kg (334 lb 6.4 oz)   SpO2 97%   BMI 46.82 kg/m    Body mass index is 46.82 kg/m .  Physical Exam   GENERAL: healthy, alert and no distress  NECK: no adenopathy, no asymmetry, masses, or scars and thyroid normal to palpation  RESP: lungs clear to auscultation - no rales, rhonchi or wheezes  CV: regular rate and rhythm, normal S1 S2, no S3 or S4, no murmur, click or rub, no peripheral edema and peripheral pulses " strong  ABDOMEN: soft, nontender, no hepatosplenomegaly, no masses and bowel sounds normal  MS: normal muscle tone, normal range of motion, peripheral pulses normal. Phalen maneuver and Tinel test   did not reproduce symptoms  SKIN: no suspicious lesions or rashes  NEURO: Normal strength and tone, mentation intact and speech normal  PSYCH: mentation appears normal, affect normal/bright    No results found for any visits on 04/06/21.

## 2021-04-06 NOTE — PROGRESS NOTES
Preceptor Attestation:    I discussed the patient with the resident and evaluated the patient in person. I have verified the content of the note, which accurately reflects my assessment of the patient and the plan of care.   Supervising Physician:  Alli Orellana MD.

## 2021-04-06 NOTE — PROGRESS NOTES
"When opening a documentation only encounter, be sure to enter in \"Chief Complaint\" Forms and in \" Comments\" Title of form, description if needed.    Vaughn is a 40 year old  male  Form received via: Fax  Form now resides in: Provider Ready    Ebonie Reilly MA                  "

## 2021-04-14 ENCOUNTER — DOCUMENTATION ONLY (OUTPATIENT)
Dept: FAMILY MEDICINE | Facility: CLINIC | Age: 41
End: 2021-04-14

## 2021-04-14 NOTE — PROGRESS NOTES
"When opening a documentation only encounter, be sure to enter in \"Chief Complaint\" Forms and in \" Comments\" Title of form, description if needed.    Vaughn is a 40 year old  male  Form received via: Fax  Form now resides in: Provider Ready    NELIDA Romano        Form has been completed by provider.     Form sent out via: Fax to [facility] human and tran services at Fax Number: [phone]  Patient informed: No, Reason:NA  Output date: April 30, 2021    NELIDA Romano      **Please close the encounter**              "

## 2021-04-16 NOTE — PROGRESS NOTES
Patient calling to check status of Medical Opinion Form., as it has been 5-10 business days since provider received it. Please review and advise.

## 2021-04-19 NOTE — PROGRESS NOTES
Patient calling to check status of form. Please review and return patient's call to provide status update. Patient can be reached at [phone]. Okay to leave voicemail.

## 2021-04-29 ENCOUNTER — VIRTUAL VISIT (OUTPATIENT)
Dept: FAMILY MEDICINE | Facility: CLINIC | Age: 41
End: 2021-04-29
Payer: MEDICAID

## 2021-04-29 DIAGNOSIS — G56.03 BILATERAL CARPAL TUNNEL SYNDROME: Primary | ICD-10-CM

## 2021-04-29 DIAGNOSIS — G89.4 CHRONIC PAIN SYNDROME: ICD-10-CM

## 2021-04-29 DIAGNOSIS — Z02.89 ENCOUNTER FOR COMPLETION OF FORM WITH PATIENT: ICD-10-CM

## 2021-04-29 PROCEDURE — 99213 OFFICE O/P EST LOW 20 MIN: CPT | Mod: 95 | Performed by: STUDENT IN AN ORGANIZED HEALTH CARE EDUCATION/TRAINING PROGRAM

## 2021-04-29 RX ORDER — GABAPENTIN 400 MG/1
800 CAPSULE ORAL 3 TIMES DAILY
Qty: 180 CAPSULE | Refills: 5 | Status: SHIPPED | OUTPATIENT
Start: 2021-04-29 | End: 2021-10-26

## 2021-04-29 NOTE — PROGRESS NOTES
Vaughn is a 40 year old who is being evaluated via a billable video visit.      How would you like to obtain your AVS? MyChart  If the video visit is dropped, the invitation should be resent by: Send to e-mail at: [email or web address]  Will anyone else be joining your video visit? No      Video Start Time: 4:35 PM    Assessment & Plan     Bilateral carpal tunnel syndrome  Encounter for completion of form with patient  Having severe pain that is limiting his ability to work. Completed medical opinion form today with him on video, stating he is unable to work. Given severity of his symptoms, he likely needs further workup and possible surgery, but unfortunately over video I wasn't able to fully evaluate his thenar atrophy. Will refer to sports med for possible injection vs evaluation/EMG and possible surgery.  - [facility]    Chronic pain syndrome  Needed a refill of his gabapentin, which does help some with carpal tunnel pain.  - gabapentin (NEURONTIN) 400 MG capsule; Take 2 capsules (800 mg) by mouth 3 times daily    FUTURE APPOINTMENTS:       - Make appointment with Sports Medicine specialist    No follow-ups on file.    Jake Sheets MD  [facility]    Jennifer Mendes is a 40 year old who presents for the following health issues     HPI     Here for medical opinion form for bilateral carpal tunnel. Saw Dr. Mcbride recently for this and was given splints, which he has been wearing, but it is not helping significantly. Still having significant pain and numbness as well. Can't hold onto things and drops things. Because of this he is unable to work at all.       Objective         Vitals:  No vitals were obtained today due to virtual visit.    Physical Exam   GENERAL: Healthy, alert and no distress  EYES: Eyes grossly normal to inspection.  No discharge or erythema, or obvious scleral/conjunctival abnormalities.  RESP: No audible wheeze, cough,  or visible cyanosis.  No visible retractions or increased work of breathing.    SKIN: Visible skin clear. No significant rash, abnormal pigmentation or lesions.  NEURO: Cranial nerves grossly intact.  Mentation and speech appropriate for age.  PSYCH: Mentation appears normal, affect normal/bright, judgement and insight intact, normal speech and appearance well-groomed.      Video-Visit Details    Type of service:  Video Visit    Video End Time: 4:54 AM    Originating Location (pt. Location): Home    Distant Location (provider location):  [facility]     Platform used for Video Visit: [employer]

## 2021-04-29 NOTE — PROGRESS NOTES
Preceptor Attestation:   Patient seen and evaluated via video visit. I discussed the patient with the resident. I have verified the content of the note, which accurately reflects my assessment of the patient and the plan of care.   Supervising Physician:  Harry Schrader DO.

## 2021-04-30 NOTE — PROGRESS NOTES
Duplicate encounter created 4/14/21, form faxed out 4/30/21. Closing encounter  Shannon Brink, EMT

## 2021-05-03 ENCOUNTER — TELEPHONE (OUTPATIENT)
Dept: FAMILY MEDICINE | Facility: CLINIC | Age: 41
End: 2021-05-03

## 2021-05-03 NOTE — TELEPHONE ENCOUNTER
Patient called to check the status of the medical opinion forms that they had completed on 4/29 at the visit with Dr. Sheets. Patient is requesting forms be faxed to [phone]. Please call patient when forms are faxed.     Jasmin Isabel, Care Coordinator

## 2021-05-04 NOTE — TELEPHONE ENCOUNTER
Patient calling to inquire about form, states they need it faxed as soon as possible and would like a call when it has been completed.

## 2021-09-24 ENCOUNTER — DOCUMENTATION ONLY (OUTPATIENT)
Dept: FAMILY MEDICINE | Facility: CLINIC | Age: 41
End: 2021-09-24
Payer: MEDICAID

## 2021-09-24 NOTE — PROGRESS NOTES
"When opening a documentation only encounter, be sure to enter in \"Chief Complaint\" Forms and in \" Comments\" Title of form, description if needed.    Vaughn is a 41 year old  male  Form received via: Fax  Form now resides in: Provider Ready    Yasmin Vazquez MA                      "
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
## 2021-10-01 ENCOUNTER — OFFICE VISIT (OUTPATIENT)
Dept: FAMILY MEDICINE | Facility: CLINIC | Age: 41
End: 2021-10-01
Payer: MEDICAID

## 2021-10-01 VITALS
TEMPERATURE: 98.4 F | BODY MASS INDEX: 44.1 KG/M2 | DIASTOLIC BLOOD PRESSURE: 85 MMHG | SYSTOLIC BLOOD PRESSURE: 150 MMHG | HEART RATE: 98 BPM | HEIGHT: 71 IN | WEIGHT: 315 LBS | OXYGEN SATURATION: 98 % | RESPIRATION RATE: 16 BRPM

## 2021-10-01 DIAGNOSIS — G56.03 BILATERAL CARPAL TUNNEL SYNDROME: Primary | ICD-10-CM

## 2021-10-01 PROBLEM — E66.01 MORBID OBESITY (H): Status: ACTIVE | Noted: 2021-10-01

## 2021-10-01 PROCEDURE — 99214 OFFICE O/P EST MOD 30 MIN: CPT | Mod: GC | Performed by: STUDENT IN AN ORGANIZED HEALTH CARE EDUCATION/TRAINING PROGRAM

## 2021-10-01 ASSESSMENT — MIFFLIN-ST. JEOR: SCORE: 2558.88

## 2021-10-01 NOTE — PATIENT INSTRUCTIONS
Patient Education   Here is the plan from today's visit    - Make a reminder alarm to put your splints on at night  - I've placed referrals for EMG studies of your arm nerves and for an orthopaedic surgeon's evaluation of your wrist/hands.    Thank you for coming to [facility] today.  COVID-19 Vaccine:  [facility] has walk-in appointments for COVID-19 vaccines. No appointment needed!   You also have the option of receiving Pfizer vaccine during your physician appointment. Please ask your care team for more information!  Lab Testing:  **If you had lab testing today and your results are reassuring or normal they will be mailed to you or sent through [employer] within 7 days.   **If the lab tests need quick action we will call you with the results.  **If you are having labs done on a different day, please call [phone] to schedule at [facility] or [phone] for other [facility] locations.   The phone number we will call with results is # [phone] (home) . If this is not the best number please call our clinic and change the number.  Medication Refills:  If you need any refills please call your pharmacy and they will contact us.   If you need to  your refill at a new pharmacy, please contact the new pharmacy directly. The new pharmacy will help you get your medications transferred faster.   Scheduling:  If you have any concerns about today's visit or wish to schedule another appointment please call our office during normal business hours [phone] (8-5:00 M-F)  If a Mammogram was ordered for you at [facility] call [phone] to schedule or change your appointment.  If you had an EKG/XRay/CT/Ultrasound/MRI ordered the number is [phone] to schedule or change your radiology appointment.   Medical Concerns:  If you have urgent medical concerns please call [phone] at any time of the day.    Gardenia Lane, DO

## 2021-10-02 ASSESSMENT — ASTHMA QUESTIONNAIRES: ACT_TOTALSCORE: 24

## 2021-10-12 NOTE — PROGRESS NOTES
Preceptor Attestation:   Patient seen, evaluated and discussed with the resident. I have verified the content of the note, which accurately reflects my assessment of the patient and the plan of care.   Supervising Physician:  Jake Garzon MD

## 2021-11-29 DIAGNOSIS — G89.4 CHRONIC PAIN SYNDROME: ICD-10-CM

## 2021-11-29 RX ORDER — GABAPENTIN 400 MG/1
800 CAPSULE ORAL 3 TIMES DAILY
Qty: 180 CAPSULE | Refills: 1 | Status: SHIPPED | OUTPATIENT
Start: 2021-11-29 | End: 2022-01-24

## 2021-11-29 NOTE — TELEPHONE ENCOUNTER
"Patient requesting refill of gabapentin. Last office visit 10/1/21 with Dr. Lane and no documented follow up plan. RN already sent 1 month supply on 10/26/21. Routing high priority for provider to order or make follow up recommendation.   Johnna Tinajero RN      Request for medication refill:    Providers if patient needs an appointment and you are willing to give a one month supply please refill for one month and  send a letter/MyChart using \".SMILLIMITEDREFILL\" .smillimited and route chart to \"P SMI \" (Giving one month refill in non controlled medications is strongly recommended before denial)    If refill has been denied, meaning absolutely no refills without visit, please complete the smart phrase \".smirxrefuse\" and route it to the \"P SMI MED REFILLS\"  pool to inform the patient and the pharmacy.           "

## 2021-11-29 NOTE — TELEPHONE ENCOUNTER
Limited refill for 2 months sent (since it is hard to schedule over December in clinic due to limited clinic openings).    I or another in person provider will need to see patient prior to next refill. A digital visit is fine for medication follow up.     Bri Tripathi MD  2:18 PM

## 2021-11-29 NOTE — TELEPHONE ENCOUNTER
Verify that the refill encounter hasn't been started Yes    [facility] phone call message- patient requesting a refill:    Full Medication Name:   gabapentin (NEURONTIN) 400 MG capsule 180 capsule     DOse:Sig - Route: Take 2 capsules (800 mg) by mouth 3 times daily - Oral      Pharmacy confirmed as       [id number] - [place], [place] - [address]  [address] [address]  Phone: [phone] Fax: [phone]  : Yes    Medication tab checked to see if medication has been sent  Yes    Additional Comments: Patient is completley out of medication.     OK to leave a message on voice mail? Yes    Advised patient refill may take up to 2 business days? Yes    Primary language: English      needed? No    Call taken on November 29, 2021 at 11:18 AM by Donna Flores to P SMI MED REFILL

## 2021-12-08 NOTE — TELEPHONE ENCOUNTER
Left voice message regarding message below. Please help patient schedule a medication follow up.  Tara Hamilton, [facility]

## 2022-01-24 DIAGNOSIS — G89.4 CHRONIC PAIN SYNDROME: ICD-10-CM

## 2022-01-24 RX ORDER — GABAPENTIN 400 MG/1
800 CAPSULE ORAL 3 TIMES DAILY
Qty: 180 CAPSULE | Refills: 0 | Status: SHIPPED | OUTPATIENT
Start: 2022-01-24 | End: 2022-02-18

## 2022-01-24 NOTE — TELEPHONE ENCOUNTER
Verify that the refill encounter hasn't been started Yes    [facility] phone call message- patient requesting a refill:    Full Medication Name:  gabapentin (NEURONTIN) 400 MG capsule 180 capsule           Dose: Sig - Route: Take 2 capsules (800 mg) by mouth 3 times daily - Oral     Pharmacy confirmed as       [id number] - [place], [place] - [address]  [address] [address]  Phone: [phone] Fax: [phone]  : Yes    Medication tab checked to see if medication has been sent  Yes    Additional Comments: Completely out     OK to leave a message on voice mail? Yes    Advised patient refill may take up to 2 business days? Yes    Primary language: English      needed? No    Call taken on January 24, 2022 at 9:44 AM by Donna Flores to P SMI MED REFILL

## 2022-01-24 NOTE — TELEPHONE ENCOUNTER
Patient requesting refill of gabapentin, out of medication. Per notes, patient in need of visit prior to refill. RN routing to PCP to refill if appropriate, routing to FD to schedule appt for patient. Ok for virtual visit.     Joseph Schwarz RN

## 2022-02-18 ENCOUNTER — OFFICE VISIT (OUTPATIENT)
Dept: FAMILY MEDICINE | Facility: CLINIC | Age: 42
End: 2022-02-18
Payer: MEDICAID

## 2022-02-18 VITALS
HEART RATE: 76 BPM | WEIGHT: 315 LBS | BODY MASS INDEX: 51.8 KG/M2 | SYSTOLIC BLOOD PRESSURE: 143 MMHG | RESPIRATION RATE: 16 BRPM | DIASTOLIC BLOOD PRESSURE: 85 MMHG | TEMPERATURE: 97.7 F | OXYGEN SATURATION: 97 %

## 2022-02-18 DIAGNOSIS — G89.4 CHRONIC PAIN SYNDROME: ICD-10-CM

## 2022-02-18 DIAGNOSIS — L90.5 PAIN IN SURGICAL SCAR: ICD-10-CM

## 2022-02-18 DIAGNOSIS — G56.03 BILATERAL CARPAL TUNNEL SYNDROME: Primary | ICD-10-CM

## 2022-02-18 DIAGNOSIS — M54.6 CHRONIC LEFT-SIDED THORACIC BACK PAIN: ICD-10-CM

## 2022-02-18 DIAGNOSIS — Z02.89 ENCOUNTER FOR COMPLETION OF FORM WITH PATIENT: ICD-10-CM

## 2022-02-18 DIAGNOSIS — R52 PAIN IN SURGICAL SCAR: ICD-10-CM

## 2022-02-18 DIAGNOSIS — G89.29 CHRONIC LEFT-SIDED THORACIC BACK PAIN: ICD-10-CM

## 2022-02-18 DIAGNOSIS — F11.21 OPIOID DEPENDENCE IN REMISSION (H): ICD-10-CM

## 2022-02-18 DIAGNOSIS — E66.01 MORBID OBESITY (H): ICD-10-CM

## 2022-02-18 DIAGNOSIS — G89.29 OTHER CHRONIC PAIN: ICD-10-CM

## 2022-02-18 PROCEDURE — 99214 OFFICE O/P EST MOD 30 MIN: CPT | Mod: GC | Performed by: STUDENT IN AN ORGANIZED HEALTH CARE EDUCATION/TRAINING PROGRAM

## 2022-02-18 RX ORDER — GABAPENTIN 400 MG/1
800 CAPSULE ORAL 3 TIMES DAILY
Qty: 180 CAPSULE | Refills: 4 | Status: SHIPPED | OUTPATIENT
Start: 2022-02-18 | End: 2022-07-08

## 2022-02-18 NOTE — PROGRESS NOTES
"  Assessment & Plan     Other chronic pain  Chronic left-sided thoracic back pain  Doing well on naproxen PRN and gabapentin. Refilled. Unable to do PT at this time. Lives in [place], has a hectic schedule.   - naproxen (NAPROSYN) 375 MG tablet; Take 1 tablet (375 mg) by mouth 2 times daily as needed for moderate pain  - gabapentin (NEURONTIN) 400 MG capsule; Take 2 capsules (800 mg) by mouth 3 times daily    Bilateral carpal tunnel syndrome  Encounter for completion of form with patient  Chronic. Has b/l hand weakness and numbness across palms. 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 Form filled out stating he cannot work at this time. Referral placed for EMG and ortho for further evaluation. Lives [place] away-would benefit from scheduling both on the same day in [place]. Considering surgery. Has not tolerated wrist braces in the past. Unable to do hand therapy due to location and personal schedule.  - Orthopedic  Referral; Future  - [facility] ([facility]); Future    Opioid dependence in remission  Doing well on methadone. No acute concerns.    Received 3 of 3 COVID shots  Due for preventative visit. Advised to make follow up visit.    Prescription drug management       BMI:   Estimated body mass index is 51.8 kg/m  as calculated from the following:    Height as of 10/1/21: 1.8 m (5' 10.87\").    Weight as of this encounter: 167.8 kg (370 lb).   Weight management plan: Patient was referred to their PCP to discuss a diet and exercise plan.      Return in about 4 weeks (around 3/18/2022) for Physical Exam.    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      Objective    BP (!) 143/85   Pulse 76   Temp 97.7  F (36.5  C) (Oral)   Resp 16   Wt (!) 167.8 kg (370 lb)   SpO2 97%   BMI 51.80 kg/m    Body mass index is 51.8 kg/m .     Physical Exam  Constitutional:       Appearance: Normal appearance. He is obese. He is not ill-appearing or diaphoretic.   Pulmonary:      Effort: Pulmonary effort is normal.   Musculoskeletal:      Comments: 4/5  strength. Negative phalen's b/l. Negative tinel's test b/l. Normal sensation of both hands.   Skin:     General: Skin is warm and dry.   Neurological:      General: No focal deficit present.      Mental Status: He is alert and oriented to person, place, and time. Mental status is at baseline.      Cranial Nerves: No cranial nerve deficit.      Motor: No weakness.      Gait: Gait normal.   Psychiatric:         Mood and Affect: Mood normal.          ----- Service Performed and Documented by Resident or Fellow ------

## 2022-02-18 NOTE — PATIENT INSTRUCTIONS
They will call you to set up the EMG (nerve test) and the orthopedics appointment (hand specialist).     Make a follow up appointment for a physical.    ____________    Patient Education   Here is the plan from today's visit    1. Encounter for completion of form with patient    2. Other chronic pain  - naproxen (NAPROSYN) 375 MG tablet; Take 1 tablet (375 mg) by mouth 2 times daily as needed for moderate pain  Dispense: 60 tablet; Refill: 3  - gabapentin (NEURONTIN) 400 MG capsule; Take 2 capsules (800 mg) by mouth 3 times daily  Dispense: 180 capsule; Refill: 4    3. Pain in surgical scar  - naproxen (NAPROSYN) 375 MG tablet; Take 1 tablet (375 mg) by mouth 2 times daily as needed for moderate pain  Dispense: 60 tablet; Refill: 3    4. Chronic pain syndrome  - naproxen (NAPROSYN) 375 MG tablet; Take 1 tablet (375 mg) by mouth 2 times daily as needed for moderate pain  Dispense: 60 tablet; Refill: 3  - gabapentin (NEURONTIN) 400 MG capsule; Take 2 capsules (800 mg) by mouth 3 times daily  Dispense: 180 capsule; Refill: 4    5. Chronic left-sided thoracic back pain  - naproxen (NAPROSYN) 375 MG tablet; Take 1 tablet (375 mg) by mouth 2 times daily as needed for moderate pain  Dispense: 60 tablet; Refill: 3  - gabapentin (NEURONTIN) 400 MG capsule; Take 2 capsules (800 mg) by mouth 3 times daily  Dispense: 180 capsule; Refill: 4    6. Bilateral carpal tunnel syndrome  - Orthopedic  Referral; Future  - EMG ([facility]); Future      Thank you for coming to [facility] today.  Lab Testing:  **If you had lab testing today and your results are reassuring or normal they will be mailed to you or sent through [employer] within 7 days.   **If the lab tests need quick action we will call you with the results.  **If you are having labs done on a different day, please call [phone] to schedule at [facility] or [phone] for other [facility] locations. Labs do not offer walk-in  appointments.  The phone number we will call with results is # [phone] (home) . If this is not the best number please call our clinic and change the number.  Medication Refills:  If you need any refills please call your pharmacy and they will contact us.   If you need to  your refill at a new pharmacy, please contact the new pharmacy directly. The new pharmacy will help you get your medications transferred faster.   Scheduling:  If you have any concerns about today's visit or wish to schedule another appointment please call our office during normal business hours [phone] (8-5:00 M-F)  If a referral was made to an [facility] specialty provider and you do not get a call from central scheduling, please refer to directions on your visit summary or call our office during normal business hours for assistance.   If a Mammogram was ordered for you at [facility] call [phone] to schedule or change your appointment.  If you had an XRay/CT/Ultrasound/MRI ordered the number is [phone] to schedule or change your radiology appointment.   [facility] has limited ultrasound appointments available on Wednesdays, if you would like your ultrasound at [facility], please call [phone] to schedule.   Medical Concerns:  If you have urgent medical concerns please call [phone] at any time of the day.    Theo Artis, DO

## 2022-07-08 DIAGNOSIS — G89.29 OTHER CHRONIC PAIN: ICD-10-CM

## 2022-07-08 DIAGNOSIS — G89.29 CHRONIC LEFT-SIDED THORACIC BACK PAIN: ICD-10-CM

## 2022-07-08 DIAGNOSIS — M54.6 CHRONIC LEFT-SIDED THORACIC BACK PAIN: ICD-10-CM

## 2022-07-08 DIAGNOSIS — G89.4 CHRONIC PAIN SYNDROME: ICD-10-CM

## 2022-07-08 RX ORDER — GABAPENTIN 400 MG/1
800 CAPSULE ORAL 3 TIMES DAILY
Qty: 180 CAPSULE | Refills: 4 | Status: SHIPPED | OUTPATIENT
Start: 2022-07-08 | End: 2022-11-17

## 2022-07-08 NOTE — TELEPHONE ENCOUNTER
"Request for medication refill:  gabapentin (NEURONTIN) 400 MG capsule  Providers if patient needs an appointment and you are willing to give a one month supply please refill for one month and  send a letter/MyChart using \".SMILLIMITEDREFILL\" .smillimited and route chart to \"P SMI \" (Giving one month refill in non controlled medications is strongly recommended before denial)    If refill has been denied, meaning absolutely no refills without visit, please complete the smart phrase \".smirxrefuse\" and route it to the \"P SMI MED REFILLS\"  pool to inform the patient and the pharmacy.    Jennifer Rios        "

## 2022-07-19 ENCOUNTER — OFFICE VISIT (OUTPATIENT)
Dept: FAMILY MEDICINE | Facility: CLINIC | Age: 42
End: 2022-07-19
Payer: MEDICAID

## 2022-07-19 VITALS
TEMPERATURE: 97.7 F | HEIGHT: 72 IN | SYSTOLIC BLOOD PRESSURE: 129 MMHG | DIASTOLIC BLOOD PRESSURE: 82 MMHG | HEART RATE: 84 BPM | BODY MASS INDEX: 42.66 KG/M2 | WEIGHT: 315 LBS | OXYGEN SATURATION: 94 %

## 2022-07-19 DIAGNOSIS — G56.03 BILATERAL CARPAL TUNNEL SYNDROME: Primary | ICD-10-CM

## 2022-07-19 DIAGNOSIS — I10 ESSENTIAL HYPERTENSION: ICD-10-CM

## 2022-07-19 DIAGNOSIS — Z02.89 ENCOUNTER FOR COMPLETION OF FORM WITH PATIENT: ICD-10-CM

## 2022-07-19 PROCEDURE — 99214 OFFICE O/P EST MOD 30 MIN: CPT | Mod: GC | Performed by: STUDENT IN AN ORGANIZED HEALTH CARE EDUCATION/TRAINING PROGRAM

## 2022-07-19 RX ORDER — HYDROCHLOROTHIAZIDE 12.5 MG/1
12.5 CAPSULE ORAL DAILY
Qty: 30 CAPSULE | Refills: 0 | Status: SHIPPED | OUTPATIENT
Start: 2022-07-19 | End: 2022-11-17

## 2022-07-19 NOTE — PROGRESS NOTES
Preceptor Attestation:   Patient seen, evaluated and discussed with the resident. I have verified the content of the note, which accurately reflects my assessment of the patient and the plan of care.   Supervising Physician:  Kamilla Kang MD

## 2022-07-19 NOTE — Clinical Note
Level 4 - chronic conditiosn not controlled (HTN and carpel tunnel) w/ med management (hydrochlorothiazide)

## 2022-07-22 NOTE — PROGRESS NOTES
"Bilateral carpal tunnel syndrome  Long-standing B/L carpel tunnel. Wrists are visibly swollen, so would not be surprised of fascial tightening of the flexor retinaculum. I will re-enter the ortho and EMG referrals, and give the patient the scheduling numbers so he can connect with them. Additionally, I will put in a PT referral and instruct the patient to ask about PT / OT options near him (if [facility] has options). As well, I will prescribe some wrist stretching exercises to perform w/ Voltaren gel to help w/ flexor retinaculum fascia decompression.     - EMG ([facility]); Future  - diclofenac (VOLTAREN) 1 % topical gel; Apply 4 g topically 4 times daily for 30 days  - Orthopedic  Referral; Future  - Occupational Therapy Referral; Future     Essential hypertension  Peripheral Edema  Noted that patient meets criteria for HTN. Previous pressures have ranged from the 130-140s SBP. Considering his carpel tunnel, edema can be at play. He also has edema in his legs / feet which he says worsens in end of day / improves in morning. We discussed usage of compression socks and potential for HTN medication. We will choose hydrochlorothiazide for it's diuretic effects as this would be helpful from a swelling standpoint.    - hydrochlorothiazide (MICROZIDE) 12.5 MG capsule; Take 1 capsule (12.5 mg) by mouth daily for 30 days         Nicotine/Tobacco Cessation:  He reports that he has been smoking cigarettes. He has been smoking about 0.50 packs per day. He has never used smokeless tobacco.    Would like to see him in a month to assess his HTN and monitor hydrochlorothiazide use.      DO WALLY Hauser [facility] FARHAN Mendes is a 41 year old, presenting for the following health issues:  Musculoskeletal Problem (Pt has been having right hand pain for about a year. The pain come and goes. Pt says sometimes he can't feel his hand and feels like \"pins and needles\".), Forms (Pt " "needs the Non-emergency medical transportation trip sheet to be signed.)        HPI   Bilateral Carpel Tunnel    Saw Dr. Artis here back in Feb - was sent to get an EMG and also consulted ortho to talk about surgical management considering how long standing this has been (diagnosis since April of last year)     Was unable to trial hand therapy d/t living 4 hours away    Lost his phone and has a new number, so unsure if he missed the calls to schedule these visits    Wanted to get re-connected now with a new phone    Still wearing his splints    This carpel tunnel has made working very very difficult      Swollen Legs     Review of Systems   Constitutional, HEENT, cardiovascular, pulmonary, gi and gu systems are negative, except as otherwise noted.           Objective       /82   Pulse 84   Temp 97.7  F (36.5  C) (Oral)   Ht 1.818 m (5' 11.58\")   Wt (!) 169.8 kg (374 lb 6.4 oz)   SpO2 94%   BMI 51.38 kg/m    Body mass index is 51.38 kg/m .  Physical Exam   GENERAL: healthy, alert and no distress  RESP: lungs clear to auscultation - no rales, rhonchi or wheezes  CV: regular rate and rhythm, normal S1 S2, no S3 or S4, no murmur, click or rub, no peripheral edema and peripheral pulses strong  MS: Swelling on B/L wrists - fascia is very tight. Weaker  strength than expected on  testing. No numbness or sensation changes on exam but patient reports he does feel more weakness/numb as he  longer. Pulses are palpated.      ----- Service Performed and Documented by Resident or Fellow ------                 .    "

## 2022-11-17 ENCOUNTER — OFFICE VISIT (OUTPATIENT)
Dept: FAMILY MEDICINE | Facility: CLINIC | Age: 42
End: 2022-11-17
Payer: MEDICAID

## 2022-11-17 ENCOUNTER — DOCUMENTATION ONLY (OUTPATIENT)
Dept: FAMILY MEDICINE | Facility: CLINIC | Age: 42
End: 2022-11-17

## 2022-11-17 VITALS
OXYGEN SATURATION: 93 % | HEIGHT: 71 IN | HEART RATE: 90 BPM | DIASTOLIC BLOOD PRESSURE: 75 MMHG | BODY MASS INDEX: 44.1 KG/M2 | SYSTOLIC BLOOD PRESSURE: 107 MMHG | TEMPERATURE: 98.2 F | WEIGHT: 315 LBS

## 2022-11-17 DIAGNOSIS — G89.4 CHRONIC PAIN SYNDROME: ICD-10-CM

## 2022-11-17 DIAGNOSIS — G89.29 CHRONIC LEFT-SIDED THORACIC BACK PAIN: ICD-10-CM

## 2022-11-17 DIAGNOSIS — F17.200 TOBACCO DEPENDENCE: ICD-10-CM

## 2022-11-17 DIAGNOSIS — I10 ESSENTIAL HYPERTENSION: ICD-10-CM

## 2022-11-17 DIAGNOSIS — R20.0 BILATERAL HAND NUMBNESS: Primary | ICD-10-CM

## 2022-11-17 DIAGNOSIS — Z79.899 ENCOUNTER FOR LONG-TERM (CURRENT) USE OF MEDICATIONS: ICD-10-CM

## 2022-11-17 DIAGNOSIS — M54.6 CHRONIC LEFT-SIDED THORACIC BACK PAIN: ICD-10-CM

## 2022-11-17 DIAGNOSIS — G89.29 OTHER CHRONIC PAIN: ICD-10-CM

## 2022-11-17 LAB
ANION GAP SERPL CALCULATED.3IONS-SCNC: 14 MMOL/L (ref 7–15)
BUN SERPL-MCNC: 15.7 MG/DL (ref 6–20)
CALCIUM SERPL-MCNC: 9.4 MG/DL (ref 8.6–10)
CHLORIDE SERPL-SCNC: 99 MMOL/L (ref 98–107)
CREAT SERPL-MCNC: 0.74 MG/DL (ref 0.67–1.17)
DEPRECATED HCO3 PLAS-SCNC: 24 MMOL/L (ref 22–29)
GFR SERPL CREATININE-BSD FRML MDRD: >90 ML/MIN/1.73M2
GLUCOSE SERPL-MCNC: 139 MG/DL (ref 70–99)
MAGNESIUM SERPL-MCNC: 2 MG/DL (ref 1.7–2.3)
POTASSIUM SERPL-SCNC: 4.1 MMOL/L (ref 3.4–5.3)
SODIUM SERPL-SCNC: 137 MMOL/L (ref 136–145)

## 2022-11-17 PROCEDURE — 83735 ASSAY OF MAGNESIUM: CPT | Performed by: STUDENT IN AN ORGANIZED HEALTH CARE EDUCATION/TRAINING PROGRAM

## 2022-11-17 PROCEDURE — 80048 BASIC METABOLIC PNL TOTAL CA: CPT | Performed by: STUDENT IN AN ORGANIZED HEALTH CARE EDUCATION/TRAINING PROGRAM

## 2022-11-17 PROCEDURE — 99214 OFFICE O/P EST MOD 30 MIN: CPT | Mod: GC | Performed by: STUDENT IN AN ORGANIZED HEALTH CARE EDUCATION/TRAINING PROGRAM

## 2022-11-17 PROCEDURE — 36415 COLL VENOUS BLD VENIPUNCTURE: CPT | Performed by: STUDENT IN AN ORGANIZED HEALTH CARE EDUCATION/TRAINING PROGRAM

## 2022-11-17 RX ORDER — HYDROCHLOROTHIAZIDE 12.5 MG/1
12.5 CAPSULE ORAL DAILY
Qty: 30 CAPSULE | Refills: 0 | Status: SHIPPED | OUTPATIENT
Start: 2022-11-17 | End: 2024-02-02

## 2022-11-17 RX ORDER — GABAPENTIN 400 MG/1
800 CAPSULE ORAL 3 TIMES DAILY
Qty: 180 CAPSULE | Refills: 4 | Status: SHIPPED | OUTPATIENT
Start: 2022-11-17 | End: 2023-04-17

## 2022-11-17 NOTE — PATIENT INSTRUCTIONS
When someone is there to supervise you, try using your wrist braces when chopping wood or doing high stress activities for your wrists.  Otherwise be sure to rotate and stretch her wrists regularly.    I will be watching for the results of your EMG at the end of the month.    I will ask our sports medicine physician here if they do wrist injections or other interventions that we have not yet tried.

## 2022-11-17 NOTE — PROGRESS NOTES
Assessment & Plan     Bilateral hand numbness  Patient with hand numbness overlying the median nerve on monofilament test, strength is still largely intact although he is also not put a lot of stressful use into his hands yet today.  Already utilizing wrist brace at night, can trial of wrist brace during provoking activities (although should do so with caution and supervision given that 1 of these activities is using an axe).  Has appointment for EMG at the end of the month, I think this will be beneficial to him.  He may be a good surgical candidate at this he has difficulty getting to hand therapy but has done all exercises asked of him and done all interventions asked of him.  He may also be a good candidate for injections given his difficulty coming into the cities and accessing resources.  Will rule out electrolyte contribution.  - Basic metabolic panel; Future  - Magnesium; Future  - Basic metabolic panel  - Magnesium    Essential hypertension  Patient with history of essential hypertension, lower extremity swelling, started HCTZ at low-dose experimentally at last visit.  Feels that it has gone well when he is remember to take it however he has not historically taken medications regularly therefore he says he is regularly forgotten to take it.  He would like a refill and he would like to try to take it regularly, we will follow-up in 1 month to check labs.  BMP ordered today.  - hydrochlorothiazide (MICROZIDE) 12.5 MG capsule; Take 1 capsule (12.5 mg) by mouth daily    Other chronic pain  Chronic pain syndrome  Chronic left-sided thoracic back pain  Encounter for long-term (current) use of medications  Patient chronically on these medications, his methadone is managed by [facility] and that is where he gets his urine drug screens.  He is reportedly in good standing with them.  Gets good benefit from the gabapentin and the naproxen, feel both helped his hands in addition to his back.  Has minimal pain in  "his hands, currently most bothered by the numbness and intermittent weakness.  - gabapentin (NEURONTIN) 400 MG capsule; Take 2 capsules (800 mg) by mouth 3 times daily  - naproxen (NAPROSYN) 375 MG tablet; Take 1 tablet (375 mg) by mouth 2 times daily as needed for moderate pain (4-6)      No follow-ups on file.    Bri Tripathi MD  [facility] FARHAN Mendes is a 42 year old, presenting for the following health issues:  RECHECK (Pt is here to follow up on carpal tunnel. Pt reports no change in condition. Pt states he can not feel his hands at times and drops items when carrying them.)      HPI     Patient checking in before EMG at the end of them.  Symptoms persist unchanged, some help with the gabapentin.  Is occasionally dropping things, wears splints on his wrists overnight, very helpful.  Does not have pain until early to the late afternoon.  Pain is improved since he stopped chopping wood over the summer, however it returns and is exacerbated by having to chop wood in preparation for a wood heating stove in the winter.    Regularly forgets to take the hydrochlorothiazide, however when he does he notices that the swelling in his legs go down.  He has minimal edema in his legs right now.  Has not taken it long enough to tell if it actually helps with his wrist pain.    Breathing symptoms are doing well, no shortness of breath or wheezing.  Has not used his albuterol inhaler in multiple weeks.  Does not sleep or live in the house with the wood-burning stove and has not had worsening of his symptoms in the winter previously.    Review of Systems   See HPI      Objective    /75   Pulse 90   Temp 98.2  F (36.8  C) (Oral)   Ht 1.806 m (5' 11.1\")   Wt (!) 170.2 kg (375 lb 3.2 oz)   SpO2 93%   BMI 52.18 kg/m    Body mass index is 52.18 kg/m .  Physical Exam   General alert and appropriate gentleman who appears stated age  Regular rate and rhythm, extremities warm " radial pulses symmetric and 2+  Speaking in full sentences on room air  Extremities decreased sensitivity to monofilament along median distribution bilaterally, right more profound than left preserved range of motion and thumb to fingertip articulation and speed bilaterally,  strength symmetric and expected, full range of motion of fingers and hands and wrists and elbows.  None of the joints are more swollen compared to others, no red or hot joints, no swollen joints.    No results found for this or any previous visit (from the past 24 hour(s)).

## 2022-11-17 NOTE — Clinical Note
Dr. Kang, does the sports medicine clinic in [facility] do wrist injections or carpal tunnel injections?  If so this patient may be a good candidate.

## 2022-11-17 NOTE — PROGRESS NOTES
"When opening a documentation only encounter, be sure to enter in \"Chief Complaint\" Forms and in \" Comments\" Title of form, description if needed.    Vaughn is a 42 year old  male  Form received via: Appointment  Form now resides in: Provider Ready    Maria G Cole MA                Form has been completed by provider.     Form sent out via: Pt took original form and a copy is to be scanned into medical records  Patient informed: Yes  Output date: November 17, 2022    Maria G Cole MA      **Please close the encounter**      "

## 2022-11-18 NOTE — PROGRESS NOTES
Preceptor Attestation:   Patient seen, evaluated and discussed with the resident. I have verified the content of the note, which accurately reflects my assessment of the patient and the plan of care.   Supervising Physician:  Bonnie Ramon, DO

## 2022-11-18 NOTE — RESULT ENCOUNTER NOTE
Other than elevated glucose, K/Mag/Na WNL and kidney markers reassuring. Unlikely to be contributing to symptoms. Will watch for EMG.

## 2022-11-21 ENCOUNTER — TELEPHONE (OUTPATIENT)
Dept: ORTHOPEDICS | Facility: CLINIC | Age: 42
End: 2022-11-21

## 2022-11-23 ENCOUNTER — TELEPHONE (OUTPATIENT)
Dept: ORTHOPEDICS | Facility: CLINIC | Age: 42
End: 2022-11-23

## 2023-04-17 ENCOUNTER — TELEPHONE (OUTPATIENT)
Dept: FAMILY MEDICINE | Facility: CLINIC | Age: 43
End: 2023-04-17
Payer: MEDICAID

## 2023-04-17 DIAGNOSIS — G89.29 OTHER CHRONIC PAIN: ICD-10-CM

## 2023-04-17 DIAGNOSIS — G89.29 CHRONIC LEFT-SIDED THORACIC BACK PAIN: ICD-10-CM

## 2023-04-17 DIAGNOSIS — G89.4 CHRONIC PAIN SYNDROME: ICD-10-CM

## 2023-04-17 DIAGNOSIS — M54.6 CHRONIC LEFT-SIDED THORACIC BACK PAIN: ICD-10-CM

## 2023-04-17 RX ORDER — GABAPENTIN 400 MG/1
800 CAPSULE ORAL 3 TIMES DAILY
Qty: 180 CAPSULE | Refills: 1 | Status: SHIPPED | OUTPATIENT
Start: 2023-04-17 | End: 2023-04-18

## 2023-04-17 NOTE — TELEPHONE ENCOUNTER
Called patient with number listed in message, he is wanting to check on his gabapentin script, I let him know that we just received the request for the refill and it has been forwarded to the provider.    Olivia Trujillo RN

## 2023-04-17 NOTE — TELEPHONE ENCOUNTER
RN unable to reach the patient and his phone is not working .        [facility] phone call message- general phone call:    Reason for call: The patient would like to speak about is medications.    Return call needed: Yes    OK to leave a message on voice mail? Yes    Primary language: English      needed? No    Call taken on April 17, 2023 at 9:18 AM by Serena Valdes RN

## 2023-04-17 NOTE — TELEPHONE ENCOUNTER
[facility] phone call message- patient requesting a refill:    Full Medication Name: gabapentin (NEURONTIN) 400 MG capsule        Pharmacy confirmed as     [id number] - [place], [place] - [address] AT [facility] [address] [address]  Phone: [phone] Fax: [phone]    : Yes    Additional Comments: The patient is requesting a refill for this medication.    OK to leave a message on voice mail? Yes    Primary language: English      needed? No    Call taken on April 17, 2023 at 9:16 AM by Serena Pelaez

## 2023-04-17 NOTE — TELEPHONE ENCOUNTER
"Last seen 11/17/22    Request for medication refill:  gabapentin (NEURONTIN) 400 MG capsule  Providers if patient needs an appointment and you are willing to give a one month supply please refill for one month and  send a letter/MyChart using \".SMILLIMITEDREFILL\" .smillimited and route chart to \"P SMI \" (Giving one month refill in non controlled medications is strongly recommended before denial)    If refill has been denied, meaning absolutely no refills without visit, please complete the smart phrase \".smirxrefuse\" and route it to the \"P SMI MED REFILLS\"  pool to inform the patient and the pharmacy.    Olivia Trujillo RN        "

## 2023-04-18 DIAGNOSIS — G89.4 CHRONIC PAIN SYNDROME: ICD-10-CM

## 2023-04-18 DIAGNOSIS — M54.6 CHRONIC LEFT-SIDED THORACIC BACK PAIN: ICD-10-CM

## 2023-04-18 DIAGNOSIS — G89.29 CHRONIC LEFT-SIDED THORACIC BACK PAIN: ICD-10-CM

## 2023-04-18 DIAGNOSIS — G89.29 OTHER CHRONIC PAIN: ICD-10-CM

## 2023-04-18 RX ORDER — GABAPENTIN 400 MG/1
800 CAPSULE ORAL 3 TIMES DAILY
Qty: 180 CAPSULE | Refills: 1 | Status: SHIPPED | OUTPATIENT
Start: 2023-04-18 | End: 2023-05-02

## 2023-05-02 ENCOUNTER — OFFICE VISIT (OUTPATIENT)
Dept: FAMILY MEDICINE | Facility: CLINIC | Age: 43
End: 2023-05-02
Payer: MEDICAID

## 2023-05-02 VITALS
OXYGEN SATURATION: 94 % | WEIGHT: 315 LBS | HEIGHT: 71 IN | SYSTOLIC BLOOD PRESSURE: 150 MMHG | RESPIRATION RATE: 18 BRPM | HEART RATE: 87 BPM | BODY MASS INDEX: 44.1 KG/M2 | DIASTOLIC BLOOD PRESSURE: 82 MMHG

## 2023-05-02 DIAGNOSIS — G89.29 CHRONIC LEFT-SIDED THORACIC BACK PAIN: ICD-10-CM

## 2023-05-02 DIAGNOSIS — J45.20 INTERMITTENT ASTHMA, UNCOMPLICATED: ICD-10-CM

## 2023-05-02 DIAGNOSIS — I10 ESSENTIAL HYPERTENSION: ICD-10-CM

## 2023-05-02 DIAGNOSIS — G89.29 OTHER CHRONIC PAIN: ICD-10-CM

## 2023-05-02 DIAGNOSIS — M54.6 CHRONIC LEFT-SIDED THORACIC BACK PAIN: ICD-10-CM

## 2023-05-02 DIAGNOSIS — G56.03 BILATERAL CARPAL TUNNEL SYNDROME: Primary | ICD-10-CM

## 2023-05-02 DIAGNOSIS — G89.4 CHRONIC PAIN SYNDROME: ICD-10-CM

## 2023-05-02 PROCEDURE — 99214 OFFICE O/P EST MOD 30 MIN: CPT | Mod: GC

## 2023-05-02 RX ORDER — HYDROCHLOROTHIAZIDE 12.5 MG/1
12.5 CAPSULE ORAL DAILY
Qty: 30 CAPSULE | Refills: 0 | Status: CANCELLED | OUTPATIENT
Start: 2023-05-02

## 2023-05-02 RX ORDER — GABAPENTIN 400 MG/1
800 CAPSULE ORAL 3 TIMES DAILY
Qty: 180 CAPSULE | Refills: 1 | Status: SHIPPED | OUTPATIENT
Start: 2023-05-02 | End: 2023-08-08

## 2023-05-02 RX ORDER — ALBUTEROL SULFATE 90 UG/1
2 AEROSOL, METERED RESPIRATORY (INHALATION) EVERY 6 HOURS
Qty: 18 G | Refills: 3 | Status: SHIPPED | OUTPATIENT
Start: 2023-05-02 | End: 2024-02-02

## 2023-05-02 NOTE — PATIENT INSTRUCTIONS
Patient Education   Here is the plan from today's visit    BLOOD PRESSURE  -  blood pressure cuff from one of the locations on the paper   - Check blood pressure daily   - Come back in two weeks  - BRING YOUR MONITOR     CARPAL TUNNEL  - I will put in a Neurology referral for EMG     ASTHMA  - We will discuss next time     Please call or return to clinic if your symptoms don't go away.    Follow up plan  No follow-ups on file.    Thank you for coming to [facility] today.  Lab Testing:  **If you had lab testing today and your results are reassuring or normal they will be mailed to you or sent through [employer] within 7 days.   **If the lab tests need quick action we will call you with the results.  **If you are having labs done on a different day, please call [phone] to schedule at [facility] or [phone] for other [facility] locations. Labs do not offer walk-in appointments.  The phone number we will call with results is # [phone] (home) . If this is not the best number please call our clinic and change the number.  Medication Refills:  If you need any refills please call your pharmacy and they will contact us.   If you need to  your refill at a new pharmacy, please contact the new pharmacy directly. The new pharmacy will help you get your medications transferred faster.   Scheduling:  If you have any concerns about today's visit or wish to schedule another appointment please call our office during normal business hours [phone] (8-5:00 M-F). If you can no longer make a scheduled visit, please cancel via [employer]ainGo or call us to cancel.   If a referral was made to an [facility] specialty provider and you do not get a call from central scheduling, please refer to directions on your visit summary or call our office during normal business hours for assistance.   If a Mammogram was ordered for you at the [facility] call [phone] to schedule or change  your appointment.  If you had an XRay/CT/Ultrasound/MRI ordered the number is [phone] to schedule or change your radiology appointment.   [facility] has limited ultrasound appointments available on Wednesdays, if you would like your ultrasound at [facility], please call [phone] to schedule.   Medical Concerns:  If you have urgent medical concerns please call [phone] at any time of the day.    Alexandra Guzman MD

## 2023-05-02 NOTE — PROGRESS NOTES
Assessment & Plan     Bilateral carpal tunnel syndrome  Patient reports bilateral numbness and tingling causing impairment in hand function. 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 Previously scheduled for EMG, but missed appointment. Exam with numbness to bilateral hands in medial nerve distribution on monofilament testing. Normal strength and ROM. Patient not interested in glucocorticoid injections. He would like to proceed with EMG for confirmation of diagnosis and possible pursuit of surgical intervention given impairment to function.   - EMG; Future  - Adult Neurology  Referral; Future    Essential hypertension  Patient with uncontrolled BP (150/82) in clinic in the setting of self-discontinuation of hydrochlorothiazide due to reported side effects of leg numbness. Patient states that his BP is well-controlled in other settings and does not feel that he requires antihypertensives to control. No ambulatory measurements to confirm.   - BP cuff ordered for DME  - Advised to check BP daily; proper technique reviewed   - Return in 2 weeks for reevaluation     Intermittent asthma, uncomplicated  ACT score uncontrolled in setting of running out of rescue inhaler. Refill provided.   - albuterol (PROVENTIL HFA) 108 (90 Base) MCG/ACT inhaler; Inhale 2 puffs into the lungs every 6 hours  - Return in 2 weeks for reevaluation     Chronic pain syndrome  Chronic left-sided thoracic back pain  Medication refill provided. Follows with [facility] for Methadone.   - gabapentin (NEURONTIN) 400 MG capsule; Take 2 capsules (800 mg) by mouth 3 times daily  - naproxen (NAPROSYN) 375 MG tablet; Take 1 tablet (375 mg) by mouth 2 times daily as needed for moderate pain    Alexandra Guzman MD  [facility] FARHAN Mendes is a 42 year old, presenting for the following health issues:    HPI     Hand numbness:  - Last evaluated 11/17/22 for numbness in median nerve  distribution, R>L. Electrolytes WNL. Was already using night braces. Had appointment for EMG. Considering surgery vs. injections if that was not helpful.   - Did not have EMG done. States it was cancelled by [facility]. Not given reason why. Tried to get back into clinic but unable to.   - Describes pins and needles in both hands for 15-20 minutes after waking up. Then also has numbness with trying to hold objects. States that he is dropping things more frequently.   - Unable to identify if weakness.   - Symptoms are intermittent.   - Able to sleep at night.   - He does feel that they are impairing hand function. Has difficultly carrying objects.   - Still using braces at night, but intermittently because did not help.   - Not interested in glucocorticoid injections   - Wants to do testing to see if he needs surgery     Hypertension follow-up:   - Current medications: Hydrochlorothiazide 12.5 mg daily   - Problems taking medications: States that medications made his legs numb and stopped it because of that.   - Clinic BP: 150/82  - Home BP: Does not check at home. Gets checked at Methadone clinic, and states that it is not high.   - Diet: Does not eat a lot of salt.   - Exercise: Not a lot. Tries to exercise by going for a walk 2-3x daily with dog.   - Smoking: Smokes 0.5 ppd.   - Has been having leg swelling, has been going on for quite a while   - Denies headache, visual blurring, CP/SOB    Chronic pain  - Methadone via [facility].   - Has been on medications for several   - Medications: Gabapentin 800 mg TID, Naproxen 375 mg BID PRN, Methadone     Asthma  - ACT 18 today, not well controlled   - Reports ran out of inhaler, but does not use at home   - Will discuss at follow up     Review of Systems   Respiratory: Negative for shortness of breath.    Cardiovascular: Negative for chest pain.   Neurological: Positive for numbness and paresthesias.   All other systems reviewed and are negative.         Objective   "  Resp 18   Ht 1.803 m (5' 11\")   Wt (!) 169.5 kg (373 lb 11.2 oz)   BMI 52.12 kg/m    Body mass index is 52.12 kg/m .  Physical Exam  Vitals reviewed.   Constitutional:       General: He is not in acute distress.     Appearance: Normal appearance.   HENT:      Head: Normocephalic and atraumatic.   Cardiovascular:      Rate and Rhythm: Normal rate and regular rhythm.      Heart sounds: No murmur heard.  Pulmonary:      Effort: Pulmonary effort is normal. No respiratory distress.      Breath sounds: No wheezing.   Musculoskeletal:      Right lower leg: No edema.      Left lower leg: No edema.      Comments: Numbness to bilateral hands on monofilament testing. 5/5 strength in finger , finger adduction/abduction, wrist flexion/extension/supination/pronation. Negative Tinel's. Negative Phalen's.    Skin:     General: Skin is warm and dry.      Capillary Refill: Capillary refill takes less than 2 seconds.   Neurological:      General: No focal deficit present.      Mental Status: He is alert.   Psychiatric:         Mood and Affect: Mood normal.         Behavior: Behavior normal.       ----- Service Performed and Documented by Resident or Fellow ------            DME (Durable Medical Equipment) Orders and Documentation  Orders Placed This Encounter   Procedures     Home Blood Pressure Monitor Order      The patient was assessed and it was determined the patient is in need of the following listed DME Supplies/Equipment. Please complete supporting documentation below to demonstrate medical necessity.      DME All Other Item(s) Documentation    List reason for need and supporting documentation for medical necessity below for each DME item.     1. Uncontrolled HTN on clinic readings. Need ambulatory measurements to guide therapy.     "

## 2023-05-11 ASSESSMENT — ENCOUNTER SYMPTOMS
PARESTHESIAS: 1
SHORTNESS OF BREATH: 0
NUMBNESS: 1

## 2023-07-11 ENCOUNTER — DOCUMENTATION ONLY (OUTPATIENT)
Dept: FAMILY MEDICINE | Facility: CLINIC | Age: 43
End: 2023-07-11
Payer: MEDICAID

## 2023-07-11 NOTE — PROGRESS NOTES
"When opening a documentation only encounter, be sure to enter in \"Chief Complaint\" Forms and in \" Comments\" Title of form, description if needed.    Vaughn is a 42 year old  male  Form received via: Fax  Form now resides in: Provider Deana ASIF                  "

## 2023-07-12 ENCOUNTER — DOCUMENTATION ONLY (OUTPATIENT)
Dept: FAMILY MEDICINE | Facility: CLINIC | Age: 43
End: 2023-07-12

## 2023-07-12 ENCOUNTER — MEDICAL CORRESPONDENCE (OUTPATIENT)
Dept: HEALTH INFORMATION MANAGEMENT | Facility: CLINIC | Age: 43
End: 2023-07-12

## 2023-07-12 NOTE — PROGRESS NOTES
"When opening a documentation only encounter, be sure to enter in \"Chief Complaint\" Forms and in \" Comments\" Title of form, description if needed.    Vaughn is a 42 year old  male  Form received via: Fax  Form now resides in: Provider Ready    Maria G Cole MA                Form has been completed by provider.     Form sent out via: Fax to [facility] at Fax Number: [phone]  Patient informed: N/A  Output date: July 12, 2023    Maria G Cole MA      **Please close the encounter**      "

## 2023-08-08 DIAGNOSIS — M54.6 CHRONIC LEFT-SIDED THORACIC BACK PAIN: ICD-10-CM

## 2023-08-08 DIAGNOSIS — G89.29 OTHER CHRONIC PAIN: ICD-10-CM

## 2023-08-08 DIAGNOSIS — G89.4 CHRONIC PAIN SYNDROME: ICD-10-CM

## 2023-08-08 DIAGNOSIS — G89.29 CHRONIC LEFT-SIDED THORACIC BACK PAIN: ICD-10-CM

## 2023-08-08 NOTE — TELEPHONE ENCOUNTER
"Request for medication refill: gabapentin (NEURONTIN) 400 MG capsule     Providers if patient needs an appointment and you are willing to give a one month supply please refill for one month and  send a letter/MyChart using \".SMILLIMITEDREFILL\" .smillimited and route chart to \"P SMI \" (Giving one month refill in non controlled medications is strongly recommended before denial)    If refill has been denied, meaning absolutely no refills without visit, please complete the smart phrase \".smirxrefuse\" and route it to the \"P SMI MED REFILLS\"  pool to inform the patient and the pharmacy.    Bo Farrell, [facility]    "

## 2023-08-09 RX ORDER — GABAPENTIN 400 MG/1
800 CAPSULE ORAL 3 TIMES DAILY
Qty: 180 CAPSULE | Refills: 1 | Status: SHIPPED | OUTPATIENT
Start: 2023-08-09 | End: 2023-10-05

## 2023-10-04 DIAGNOSIS — G89.4 CHRONIC PAIN SYNDROME: ICD-10-CM

## 2023-10-04 DIAGNOSIS — G89.29 CHRONIC LEFT-SIDED THORACIC BACK PAIN: ICD-10-CM

## 2023-10-04 DIAGNOSIS — G89.29 OTHER CHRONIC PAIN: ICD-10-CM

## 2023-10-04 DIAGNOSIS — M54.6 CHRONIC LEFT-SIDED THORACIC BACK PAIN: ICD-10-CM

## 2023-10-05 RX ORDER — GABAPENTIN 400 MG/1
CAPSULE ORAL
Qty: 180 CAPSULE | Refills: 1 | Status: SHIPPED | OUTPATIENT
Start: 2023-10-05 | End: 2023-12-05

## 2023-12-04 DIAGNOSIS — M54.6 CHRONIC LEFT-SIDED THORACIC BACK PAIN: ICD-10-CM

## 2023-12-04 DIAGNOSIS — G89.29 CHRONIC LEFT-SIDED THORACIC BACK PAIN: ICD-10-CM

## 2023-12-04 DIAGNOSIS — G89.29 OTHER CHRONIC PAIN: ICD-10-CM

## 2023-12-04 DIAGNOSIS — G89.4 CHRONIC PAIN SYNDROME: ICD-10-CM

## 2023-12-05 RX ORDER — GABAPENTIN 400 MG/1
CAPSULE ORAL
Qty: 180 CAPSULE | Refills: 1 | Status: SHIPPED | OUTPATIENT
Start: 2023-12-05 | End: 2024-02-02

## 2024-02-01 DIAGNOSIS — G89.29 OTHER CHRONIC PAIN: ICD-10-CM

## 2024-02-01 DIAGNOSIS — G89.4 CHRONIC PAIN SYNDROME: ICD-10-CM

## 2024-02-01 DIAGNOSIS — M54.6 CHRONIC LEFT-SIDED THORACIC BACK PAIN: ICD-10-CM

## 2024-02-01 DIAGNOSIS — G89.29 CHRONIC LEFT-SIDED THORACIC BACK PAIN: ICD-10-CM

## 2024-02-02 ENCOUNTER — TELEPHONE (OUTPATIENT)
Dept: FAMILY MEDICINE | Facility: CLINIC | Age: 44
End: 2024-02-02

## 2024-02-02 ENCOUNTER — OFFICE VISIT (OUTPATIENT)
Dept: FAMILY MEDICINE | Facility: CLINIC | Age: 44
End: 2024-02-02
Payer: MEDICAID

## 2024-02-02 VITALS
WEIGHT: 315 LBS | HEIGHT: 71 IN | TEMPERATURE: 98 F | RESPIRATION RATE: 18 BRPM | DIASTOLIC BLOOD PRESSURE: 88 MMHG | SYSTOLIC BLOOD PRESSURE: 136 MMHG | BODY MASS INDEX: 44.1 KG/M2 | OXYGEN SATURATION: 96 % | HEART RATE: 103 BPM

## 2024-02-02 DIAGNOSIS — E66.01 MORBID OBESITY (H): ICD-10-CM

## 2024-02-02 DIAGNOSIS — F11.21 OPIOID DEPENDENCE IN REMISSION (H): ICD-10-CM

## 2024-02-02 DIAGNOSIS — I10 ESSENTIAL HYPERTENSION: ICD-10-CM

## 2024-02-02 DIAGNOSIS — M54.6 CHRONIC LEFT-SIDED THORACIC BACK PAIN: ICD-10-CM

## 2024-02-02 DIAGNOSIS — R60.0 BILATERAL LOWER EXTREMITY EDEMA: ICD-10-CM

## 2024-02-02 DIAGNOSIS — G89.4 CHRONIC PAIN SYNDROME: Primary | ICD-10-CM

## 2024-02-02 DIAGNOSIS — F17.200 NICOTINE DEPENDENCE, UNCOMPLICATED, UNSPECIFIED NICOTINE PRODUCT TYPE: ICD-10-CM

## 2024-02-02 DIAGNOSIS — Z00.00 HEALTHCARE MAINTENANCE: ICD-10-CM

## 2024-02-02 DIAGNOSIS — G89.29 OTHER CHRONIC PAIN: ICD-10-CM

## 2024-02-02 DIAGNOSIS — J45.20 INTERMITTENT ASTHMA, UNCOMPLICATED: ICD-10-CM

## 2024-02-02 DIAGNOSIS — G89.29 CHRONIC LEFT-SIDED THORACIC BACK PAIN: ICD-10-CM

## 2024-02-02 DIAGNOSIS — Z11.59 NEED FOR HEPATITIS C SCREENING TEST: ICD-10-CM

## 2024-02-02 LAB
ANION GAP SERPL CALCULATED.3IONS-SCNC: 9 MMOL/L (ref 7–15)
BUN SERPL-MCNC: 15.1 MG/DL (ref 6–20)
CALCIUM SERPL-MCNC: 9.1 MG/DL (ref 8.6–10)
CHLORIDE SERPL-SCNC: 102 MMOL/L (ref 98–107)
CHOLEST SERPL-MCNC: 155 MG/DL
CREAT SERPL-MCNC: 0.79 MG/DL (ref 0.67–1.17)
CREAT UR-MCNC: 119 MG/DL
DEPRECATED HCO3 PLAS-SCNC: 29 MMOL/L (ref 22–29)
EGFRCR SERPLBLD CKD-EPI 2021: >90 ML/MIN/1.73M2
FASTING STATUS PATIENT QL REPORTED: ABNORMAL
GLUCOSE SERPL-MCNC: 161 MG/DL (ref 70–99)
HBA1C MFR BLD: 8.9 % (ref 0–5.6)
HCV AB SERPL QL IA: NONREACTIVE
HDLC SERPL-MCNC: 43 MG/DL
LDLC SERPL CALC-MCNC: 77 MG/DL
MICROALBUMIN UR-MCNC: <12 MG/L
MICROALBUMIN/CREAT UR: NORMAL MG/G{CREAT}
NONHDLC SERPL-MCNC: 112 MG/DL
POTASSIUM SERPL-SCNC: 4.2 MMOL/L (ref 3.4–5.3)
SODIUM SERPL-SCNC: 140 MMOL/L (ref 135–145)
TRIGL SERPL-MCNC: 174 MG/DL

## 2024-02-02 PROCEDURE — 36415 COLL VENOUS BLD VENIPUNCTURE: CPT | Performed by: STUDENT IN AN ORGANIZED HEALTH CARE EDUCATION/TRAINING PROGRAM

## 2024-02-02 PROCEDURE — 99214 OFFICE O/P EST MOD 30 MIN: CPT | Mod: GC | Performed by: STUDENT IN AN ORGANIZED HEALTH CARE EDUCATION/TRAINING PROGRAM

## 2024-02-02 PROCEDURE — 82570 ASSAY OF URINE CREATININE: CPT | Performed by: STUDENT IN AN ORGANIZED HEALTH CARE EDUCATION/TRAINING PROGRAM

## 2024-02-02 PROCEDURE — 83036 HEMOGLOBIN GLYCOSYLATED A1C: CPT | Performed by: STUDENT IN AN ORGANIZED HEALTH CARE EDUCATION/TRAINING PROGRAM

## 2024-02-02 PROCEDURE — 80061 LIPID PANEL: CPT | Performed by: STUDENT IN AN ORGANIZED HEALTH CARE EDUCATION/TRAINING PROGRAM

## 2024-02-02 PROCEDURE — 80048 BASIC METABOLIC PNL TOTAL CA: CPT | Performed by: STUDENT IN AN ORGANIZED HEALTH CARE EDUCATION/TRAINING PROGRAM

## 2024-02-02 PROCEDURE — 86803 HEPATITIS C AB TEST: CPT | Performed by: STUDENT IN AN ORGANIZED HEALTH CARE EDUCATION/TRAINING PROGRAM

## 2024-02-02 PROCEDURE — 82043 UR ALBUMIN QUANTITATIVE: CPT | Performed by: STUDENT IN AN ORGANIZED HEALTH CARE EDUCATION/TRAINING PROGRAM

## 2024-02-02 RX ORDER — NICOTINE 21 MG/24HR
1 PATCH, TRANSDERMAL 24 HOURS TRANSDERMAL EVERY 24 HOURS
Qty: 14 PATCH | Refills: 0 | Status: SHIPPED | OUTPATIENT
Start: 2024-03-15 | End: 2024-03-29

## 2024-02-02 RX ORDER — GABAPENTIN 400 MG/1
CAPSULE ORAL
Qty: 180 CAPSULE | Refills: 1 | Status: SHIPPED | OUTPATIENT
Start: 2024-02-02 | End: 2024-05-28

## 2024-02-02 RX ORDER — GABAPENTIN 400 MG/1
CAPSULE ORAL
Qty: 180 CAPSULE | Refills: 3 | Status: SHIPPED | OUTPATIENT
Start: 2024-02-02 | End: 2024-05-26

## 2024-02-02 RX ORDER — ALBUTEROL SULFATE 90 UG/1
2 AEROSOL, METERED RESPIRATORY (INHALATION) EVERY 6 HOURS
Qty: 18 G | Refills: 3 | Status: SHIPPED | OUTPATIENT
Start: 2024-02-02

## 2024-02-02 RX ORDER — NICOTINE 21 MG/24HR
1 PATCH, TRANSDERMAL 24 HOURS TRANSDERMAL EVERY 24 HOURS
Qty: 42 PATCH | Refills: 0 | Status: SHIPPED | OUTPATIENT
Start: 2024-02-02 | End: 2024-03-15

## 2024-02-02 ASSESSMENT — ASTHMA QUESTIONNAIRES
QUESTION_2 LAST FOUR WEEKS HOW OFTEN HAVE YOU HAD SHORTNESS OF BREATH: NOT AT ALL
QUESTION_5 LAST FOUR WEEKS HOW WOULD YOU RATE YOUR ASTHMA CONTROL: SOMEWHAT CONTROLLED
ACT_TOTALSCORE: 21
ACT_TOTALSCORE: 21
QUESTION_3 LAST FOUR WEEKS HOW OFTEN DID YOUR ASTHMA SYMPTOMS (WHEEZING, COUGHING, SHORTNESS OF BREATH, CHEST TIGHTNESS OR PAIN) WAKE YOU UP AT NIGHT OR EARLIER THAN USUAL IN THE MORNING: NOT AT ALL
QUESTION_1 LAST FOUR WEEKS HOW MUCH OF THE TIME DID YOUR ASTHMA KEEP YOU FROM GETTING AS MUCH DONE AT WORK, SCHOOL OR AT HOME: A LITTLE OF THE TIME
QUESTION_4 LAST FOUR WEEKS HOW OFTEN HAVE YOU USED YOUR RESCUE INHALER OR NEBULIZER MEDICATION (SUCH AS ALBUTEROL): ONCE A WEEK OR LESS

## 2024-02-02 NOTE — TELEPHONE ENCOUNTER
Attempted to call pt to relay provider message below. No answer, lmtcb.    Liza Hunter RN            ----- Message from Rola Betancourt MD sent at 2/2/2024 12:53 PM CST -----  Patient with new diagnosis of diabetes based on A1C. Routing to RN pool to please communicate result with patient and need for appointment for diabetes management within the next two weeks (preferably with PCP).     Rola Betancourt MD

## 2024-02-02 NOTE — PROGRESS NOTES
Assessment & Plan     Other chronic pain  Chronic pain syndrome  Chronic left-sided thoracic back pain  Well controlled on gabapentin and naproxen. Encouraged to take ppi prophylactically due to chronic nsaid use.  - gabapentin (NEURONTIN) 400 MG capsule; TAKE 2 CAPSULES(800 MG) BY MOUTH THREE TIMES DAILY  - naproxen (NAPROSYN) 375 MG tablet; Take 1 tablet (375 mg) by mouth 2 times daily as needed for moderate pain    Intermittent asthma, uncomplicated  No PFTs on file.   - General PFT Lab (Please always keep checked); Future  - Pulmonary Function Test; Future  - albuterol (PROVENTIL HFA) 108 (90 Base) MCG/ACT inhaler; Inhale 2 puffs into the lungs every 6 hours    Need for hepatitis C screening test  - Hepatitis C Screen Reflex to HCV RNA Quant and Genotype; Future  - Hepatitis C Screen Reflex to HCV RNA Quant and Genotype    Nicotine dependence, uncomplicated, unspecified nicotine product type  - nicotine (NICODERM CQ) 21 MG/24HR 24 hr patch; Place 1 patch onto the skin every 24 hours for 42 days  - nicotine (NICODERM CQ) 14 MG/24HR 24 hr patch; Place 1 patch onto the skin every 24 hours for 14 days  - nicotine (NICODERM CQ) 7 MG/24HR 24 hr patch; Place 1 patch onto the skin every 24 hours for 14 days  - nicotine (NICORETTE) 4 MG lozenge; How to take it: When the urge to smoke occurs, suck (do not chew) on 1 lozenge to release nicotine. Do not eat or drink while the lozenge is in your mouth. Follow this schedule: Weeks 1 to 6: One lozenge every 1 to 2 hours. Use at least 9 lozenges a day, but no more than 20. Weeks 7 to 9: One lozenge every 2 to 4 hours. Weeks 10 to 12: One lozenge every 4 to 8 hours  - SMOKING CESSATION COUNSELING, 3-10 MIN  - Quit Partner (Tobacco Cessation) Referral; Future    Bilateral lower extremity edema  Worsening bilateral lower extremity edema over the past 2-3 months. No known cardiac history, though suspect longstanding poorly controlled hypertension. Will obtain echocardiogram to  "evaluate for systolic dysfunction. No crackles appreciated on lung exam, and no orthopnea endorsed. Higher suspicion for venous stasis edema, particularly due to venous stasis dermatitis findings. Compression stockings prescribed.   - Echocardiogram Complete; Future  - Orthotics and Prosthetics Order Compression; Leg; Knee; Bilateral; 20/30 mmHg; 12 Pair    Opioid dependence in remission (H)  - RUST    Essential hypertension  Elevated blood pressures in clinic on several occasions, though patient does not recall home blood pressure measurements. Not on antihypertensives. Will obtain urine albumin to determine extent of hypertensive kidney injury, if any.   - Albumin Random Urine Quantitative with Creat Ratio; Future  - Albumin Random Urine Quantitative with Creat Ratio    Morbid obesity (H)  BMI of 53.06.   - Basic metabolic panel; Future  - Lipid panel; Future  - Hemoglobin A1c; Future  - Basic metabolic panel  - Lipid panel  - Hemoglobin A1c      Nicotine/Tobacco Cessation  He reports that he has been smoking cigarettes. He has been smoking an average of 0.5 packs per day. He has never used smokeless tobacco.  Nicotine/Tobacco Cessation Plan  Phone counseling: Place order for Quit Partner Referral  Pharmacotherapies : Nicotine patch and Nicotine lozenge        Jennifer Mendes is a 43 year old, presenting for the following health issues:  RECHECK (Pt here for med follow up)      2/2/2024     8:50 AM   Additional Questions   Roomed by Doua   Accompanied by Self         2/2/2024     8:50 AM   Patient Reported Additional Medications   Patient reports taking the following new medications n/a     HPI       Has blood pressure machine at home, but does not check blood pressure regularly. When was checking blood pressures at home, was getting \"normal but kind of high\" numbers, but not able to give specific numbers.     Has felt increased shortness of breath with increased weight gain recently. Feels " "that asthma is well controlled, only uses albuterol inhaler 1-2 times per month. Asthma is triggered more so by physical activity rather than allergens or illness. Currently smoking 1/2 pack per day, has been smoking for 22 years. Has not tried to quit before, but is interested.     Still following with [facility] for methadone. Needs refills on naproxen and gabapentin. Takes naproxen 2-3 times weekly.           Objective    /88 (BP Location: Left arm, Patient Position: Sitting, Cuff Size: Adult Large)   Pulse 103   Temp 98  F (36.7  C) (Oral)   Resp 18   Ht 1.803 m (5' 11\")   Wt (!) 172.5 kg (380 lb 6.4 oz)   SpO2 96%   BMI 53.06 kg/m    Body mass index is 53.06 kg/m .  Physical Exam  Vitals reviewed.   Constitutional:       Appearance: Normal appearance.   HENT:      Head: Normocephalic.   Eyes:      Conjunctiva/sclera: Conjunctivae normal.   Cardiovascular:      Rate and Rhythm: Normal rate and regular rhythm.      Heart sounds: Normal heart sounds.   Pulmonary:      Effort: Pulmonary effort is normal.      Breath sounds: Normal breath sounds.   Musculoskeletal:         General: No swelling.      Right lower le+ Edema present.      Left lower le+ Edema present.   Skin:     General: Skin is warm and dry.   Neurological:      Mental Status: He is alert.   Psychiatric:         Mood and Affect: Mood normal.         Behavior: Behavior normal.         Thought Content: Thought content normal.         Judgment: Judgment normal.                Signed Electronically by: Rola Betancourt MD  DME (Durable Medical Equipment) Orders and Documentation  Orders Placed This Encounter   Procedures    Orthotics and Prosthetics Order Compression; Leg; Knee; Bilateral; 20/30 mmHg; 12 Pair        The patient was assessed and it was determined the patient is in need of the following listed DME Supplies/Equipment. Please complete supporting documentation below to demonstrate medical necessity.              "

## 2024-02-02 NOTE — PROGRESS NOTES
Preceptor Attestation:    I discussed the patient with the resident and evaluated the patient in person. I have verified the content of the note, which accurately reflects my assessment of the patient and the plan of care.   Supervising Physician:  Olegario Dietz MD, MD.

## 2024-02-02 NOTE — PATIENT INSTRUCTIONS
Nicotine Transdermal System   Habitrol, Nicoderm C-Q    Uses  For quitting smoking.    Instructions  DO NOT take this medicine by mouth.    Avoid placing the patch near the breast.    Remove the patch after 24 hours.    Keep the medicine at room temperature. Avoid heat and direct light.    This patch should not be cut.    Wash your hands before and after handling this medicine.    Remove old patch before applying new one. Change the location of the new patch.    If you have vivid dreams or trouble sleeping, you may remove the patch before going to sleep.    Ask your doctor or pharmacist about locations on your body where this patch can be used.    Remove the plastic liner that protects the sticky side of the patch before applying to the skin.    Be sure the area of skin is clean and dry before putting on a new patch.    Apply the patch to a clean, dry, hairless area.    Press the patch firmly for a few seconds to make sure it stays in place.    After removing the patch, fold it together and discard it out of reach of children and pets.    Please ask your doctor or pharmacist how you can safely dispose of used patches.    If the skin under the patch becomes irritated, remove the patch. Do not apply a new patch to the area until the skin feels better.    To avoid irritating your skin, use a different location for a new patch.    Apply the patch only to normal looking skin. Avoid areas of the skin that are red, have scrapes, or damaged.    If the patch falls off, apply a new a patch on a different location of the body.    Please tell your doctor and pharmacist about all the medicines you take. Include both prescription and over-the-counter medicines. Also tell them about any vitamins, herbal medicines, or anything else you take for your health.    If you need to stop this medicine, your doctor may wish to gradually reduce the dosage before stopping.    Do not use more than 1 patch at any one time.    Cautions  Tell  your doctor and pharmacist if you ever had an allergic reaction to a medicine. Symptoms of an allergic reaction can include trouble breathing, skin rash, itching, swelling, or severe dizziness.    Do not use the medication any more than instructed.    Avoid smoking while on this medicine. Smoking may increase your risk for stroke, heart attack, blood clots, high blood pressure, and other diseases of the heart and blood vessels.    Tell the doctor or pharmacist if you are pregnant, planning to be pregnant, or breastfeeding.    Ask your pharmacist if this medicine can interact with any of your other medicines. Be sure to tell them about all the medicines you take.    Please tell all your doctors and dentists that you are on this medicine before they provide care.    Side Effects  The following is a list of some common side effects from this medicine. Please speak with your doctor about what you should do if you experience these or other side effects.    skin irritation where medicine is applied    If you have any of the following side effects, you may be getting too much medicine. Please contact your doctor to let them know about these side effects.    diarrhea  dizziness  nausea  rapid heartbeat  vomiting    A few people may have an allergic reaction to this medicine. Symptoms can include difficulty breathing, skin rash, itching, swelling, or severe dizziness. If you notice any of these symptoms, seek medical help quickly.    Extra  Please speak with your doctor, nurse, or pharmacist if you have any questions about this medicine.      [email or web address]  IMPORTANT NOTE: This document tells you briefly how to take your medicine, but it does not tell you all there is to know about it. Your doctor or pharmacist may give you other documents about your medicine. Please talk to them if you have any questions. Always follow their advice. There is a more complete description of this medicine  available in English. Scan this code on your smartphone or tablet or use the web address below. You can also ask your pharmacist for a printout. If you have any questions, please ask your pharmacist.   2021 [employer].      3013-4972 [employer]. All rights reserved. This information is not intended as a substitute for professional medical care. Always follow your healthcare professional's instructions.

## 2024-02-19 NOTE — TELEPHONE ENCOUNTER
Called pt and relayed provider message below. Got pt scheduled with PCP on Wednesday 2/27 at 3PM.      Liza Hunter RN

## 2024-02-28 ENCOUNTER — CARE COORDINATION (OUTPATIENT)
Dept: FAMILY MEDICINE | Facility: CLINIC | Age: 44
End: 2024-02-28

## 2024-02-28 ENCOUNTER — OFFICE VISIT (OUTPATIENT)
Dept: FAMILY MEDICINE | Facility: CLINIC | Age: 44
End: 2024-02-28
Payer: MEDICAID

## 2024-02-28 VITALS
OXYGEN SATURATION: 92 % | BODY MASS INDEX: 44.1 KG/M2 | RESPIRATION RATE: 19 BRPM | WEIGHT: 315 LBS | HEART RATE: 75 BPM | TEMPERATURE: 98.6 F | SYSTOLIC BLOOD PRESSURE: 126 MMHG | DIASTOLIC BLOOD PRESSURE: 78 MMHG | HEIGHT: 71 IN

## 2024-02-28 DIAGNOSIS — Z76.89 HEALTH CARE HOME: ICD-10-CM

## 2024-02-28 DIAGNOSIS — E11.9 TYPE 2 DIABETES MELLITUS WITHOUT COMPLICATION, WITHOUT LONG-TERM CURRENT USE OF INSULIN (H): Primary | ICD-10-CM

## 2024-02-28 PROCEDURE — 99214 OFFICE O/P EST MOD 30 MIN: CPT | Mod: GC

## 2024-02-28 RX ORDER — ROSUVASTATIN CALCIUM 10 MG/1
10 TABLET, COATED ORAL DAILY
Qty: 30 TABLET | Refills: 0 | Status: SHIPPED | OUTPATIENT
Start: 2024-02-28 | End: 2024-03-20

## 2024-02-28 RX ORDER — METFORMIN HCL 500 MG
500 TABLET, EXTENDED RELEASE 24 HR ORAL
Qty: 30 TABLET | Refills: 0 | Status: SHIPPED | OUTPATIENT
Start: 2024-02-28 | End: 2024-03-20

## 2024-02-28 NOTE — PROGRESS NOTES
Patient's PCP asked that  reach out with food resources.    SW was able to connect with patient in the lobby before leaving and provide the combined application form. SW walked patient through the application and encouraged patient to call with any questions or concerns after reviewing the application with family at home.    Patient agreeable to this plan. No other questions were noted at this time.    FERNANDO Lizarraga

## 2024-02-28 NOTE — PATIENT INSTRUCTIONS
Patient Education   Here is the plan from today's visit    1. Type 2 diabetes mellitus without complication, without long-term current use of insulin (H)    - metFORMIN (GLUCOPHAGE XR) 500 MG 24 hr tablet; Take 1 tablet (500 mg) by mouth daily (with dinner)  Dispense: 30 tablet; Refill: 0  - rosuvastatin (CRESTOR) 10 MG tablet; Take 1 tablet (10 mg) by mouth daily  Dispense: 30 tablet; Refill: 0  - Adult Eye  Referral; Future      Understanding Diabetes Type 2:     When your body is working normally, the food you eat is digested and broken down, resulting in a sugar called glucose. Some of this glucose is stored in the liver, however most of it enters the blood stream and travels to the cells to be used as fuel. Glucose needs the help of a hormone, insulin, to enter the cells. Insulin is a made in the pancreas. It is released into the bloodstream in response to glucose in the blood.  Think of insulin as a key. When insulin reaches a cell, it attaches to that cell wall, and this signals the cell to open, allowing glucose to enter the cell.    When you have Diabetes type 2: - your cells don t respond very well to insulin. Because of this, less glucose goes into cells. This is called  insulin resistance .  In response to this, the pancreas makes more insulin, but eventually the pancreas can t make enough to overcome the insulin resistance. As less glucose enters the cells, it builds up in the bloodstream. This is known as hyperglycemia or high blood sugar. The cells then become starved for energy - which can make one feeling tired and rundown.     If high blood sugars are not controlled, blood vessels throughout the body become damaged. Longterm high blood sugar levels affects organs and nerves - as a result, there is risk to the heart, kidneys, eyes and limbs. Diabetes can also make other illnesses worse - such as high blood pressure and high cholesterol. Over time, people with uncontrolled high blood sugars  have a high chance of dying or becoming disabled by heart attack or stroke.     Your actual blood sugar level is a result of the balance between several factors  - for example, what kind of food you eat and how much exercise you get and how much insulin is present in your body. Eating too much of the wrong foods, and not taking your diabetes meds on time can cause high blood sugar. Infections can also cause high blood sugar. Missing meals or not eating enough can lead to low blood sugar.   Normal blood sugar levels are  in the morning on an empty stomach, and not more than 180 two hours after a meal.     You need prompt medical attention if any of the following occur:   Blood sugars are too HIGH: frequent urination, dizziness, drowsiness, thirst, headaches, nausea, vomiting, abdominal pain, vision changes, fast breathing or confusion.   Blood sugars are too LOW: fatigue, headache, shakes, excess sweating, hunger, feeling restless or anxious, vision changes, drowsiness, weakness, confusion or loss of consciousness.   Chest pain occurs  Dizziness, fainting, weakness of an arm or leg or one side of face  Trouble with speech or vision      HOME CARE:     Follow your prescribed diabetic diet and take your medication or insulin exactly as ordered.   Monitor blood sugars as advised and keep a log of your results. This will help your doctor guide the medication to keep your blood sugar under control.   Try to achieve your ideal weight - proper diet and exercise can reduce or eliminate the need to take DM meds.   Pay attention to good foot care. Check your feet and between your toes at least once a week.   Wear a medical alert bracelet or carry a card in your wallet, indicating that you are diabetic. In the event that you become ill and are unable to give this information, it will help medical personnel provide proper care.     Always carry a source of fast acting sugar with you in case you get symptoms of low blood  sugar (below 70). At the first sign of low blood sugar, eat or drink 15-20 g of fast acting sugar to raise your blood sugar. Example:   3 to 4 glucose tablets  4 ounces of regular soft drinks  4 ounces of any fruit juice  8 ounces of milk  5-6 pieces of hard candy  1 tablespoon of honey  Check your blood sugar 15 minutes after treating yourself. If it is still low (below 70), take another 15-20 grams of fast acting sugar. Test again in 15 minutes; if it returns to normal (70+) eat a snack or a meal to keep your blood sugar in safe range. If it remains low, call your doctor or go to an emergency room.        Please call or return to clinic if your symptoms don't go away.    Follow up plan  Return in about 4 weeks (around 3/27/2024).    Thank you for coming to [facility] today.  Lab Testing:  **If you had lab testing today and your results are reassuring or normal they will be mailed to you or sent through [employer] within 7 days.   **If the lab tests need quick action we will call you with the results.  **If you are having labs done on a different day, please call [phone] to schedule at [facility] or [phone] for other [facility] locations. Labs do not offer walk-in appointments.  The phone number we will call with results is # [phone] (home) . If this is not the best number please call our clinic and change the number.  Medication Refills:  If you need any refills please call your pharmacy and they will contact us.   If you need to  your refill at a new pharmacy, please contact the new pharmacy directly. The new pharmacy will help you get your medications transferred faster.   Scheduling:  If you have any concerns about today's visit or wish to schedule another appointment please call our office during normal business hours [phone] (8-5:00 M-F). If you can no longer make a scheduled visit, please cancel via [employer] or call us to cancel.   If a referral was made to  an [facility] specialty provider and you do not get a call from central scheduling, please refer to directions on your visit summary or call our office during normal business hours for assistance.   If a Mammogram was ordered for you at the [facility] call [phone] to schedule or change your appointment.  If you had an XRay/CT/Ultrasound/MRI ordered the number is [phone] to schedule or change your radiology appointment.   [facility] has limited ultrasound appointments available on Wednesdays, if you would like your ultrasound at [facility], please call [phone] to schedule.   Medical Concerns:  If you have urgent medical concerns please call [phone] at any time of the day.    Radha Modi MD

## 2024-02-28 NOTE — PROGRESS NOTES
Assessment & Plan     Vaughn is a 43 year old patient who presents for the following concerns:    Type 2 diabetes mellitus without complication, without long-term current use of insulin (H)  New diagnosis, A1C 8.9. Not yet at glycemic control (<7%). Normal GFR and microalbumin. No complaints c/f microvascular disease aside from possible venous stasis in LE, which may be due to weight.  Begin: metformin XR at 500mg daily with dinner and rosuvastatin 10mg daily  Next visit, increase metformin to 1000mg daily and rosuvastatin to 20mg daily  Dietary changes stressed--lower carb by ratio. Financial barriers to healthy food, will route chart to  to see if additional financial assistance possible  Eye exam by Ophthalmology recommended annually, referral placed  Microalbumin creatitine ratio Normal 2/2/2024  Follow up in 1 month. with myself    Blood pressure:  Goal < 130/80mmHg  At goal? Yes, without medication    Cholesterol:   Goal LDL <  130    At goal?  Yes, without medication  (77 on 2/2/2024)    Smoking:    At goal?  No, attempting cessation with NRT    ASA:  <51 yo and no add'l ASCVD risk factor, therefore ASA is not recommended      Orders Placed This Encounter   Procedures    Adult Eye  Referral     The 10-year ASCVD risk score (Bhupinder DK, et al., 2019) is: 5.6%    Values used to calculate the score:      Age: 43 years      Sex: Male      Is Non- : No      Diabetic: Yes      Tobacco smoker: Yes      Systolic Blood Pressure: 126 mmHg      Is BP treated: No      HDL Cholesterol: 43 mg/dL      Total Cholesterol: 155 mg/dL    Post Medication Reconciliation Status: medications reconciled and changed, per note/orders    Return in about 4 weeks (around 3/27/2024).    Radha Modi MD  [facility]        Subjective         2/28/2024     2:46 PM   Additional Questions   Roomed by joesph   Accompanied by self         2/28/2024     2:46 PM   Patient Reported Additional  "Medications   Patient reports taking the following new medications none       This is a 43 year old patient, presenting for the following health concerns:    RECHECK (Diabetes diagnosis  )    Vaughn Bates  Is a 43 year old male who presents today for new diagnosis of T2DM    Habits:   Patient has received diabetes nutrition education 'a few years ago' after diagnosis with pre-diabetes. Plans on reducing sugar and eating more veggies but hard to afford  Exercise moderate regular exercise program walking for 6 hours per day    Social History     Tobacco Use    Smoking status: Every Day     Packs/day: .5     Types: Cigarettes    Smokeless tobacco: Never   Vaping Use    Vaping Use: Never used   Substance Use Topics    Alcohol use: No    Drug use: No             Current Concerns  Patient : is concerned about this new diagnosis, and swelling in legs           OBJECTIVE:    EXAM:  /78 (BP Location: Left arm)   Pulse 75   Temp 98.6  F (37  C)   Resp 19   Ht 1.803 m (5' 11\")   Wt (!) 177.4 kg (391 lb)   SpO2 92%   BMI 54.53 kg/m    Body mass index is 54.53 kg/m .                             Foot Exam:  normal DP and PT pulses, normal sensory exam, normal monofilament exam, venous stasis dermatitis noted, dry cracking heels, and nail exam dystrophic nails    Review of Systems   Constitutional, HEENT, cardiovascular, pulmonary, gi and gu systems are negative, except as otherwise noted.    Neurologic: negative for and numbness or tingling of feet      Objective    /78 (BP Location: Left arm)   Pulse 75   Temp 98.6  F (37  C)   Resp 19   Ht 1.803 m (5' 11\")   Wt (!) 177.4 kg (391 lb)   SpO2 92%   BMI 54.53 kg/m      Physical Exam    General: Alert and oriented, in no acute distress.  CV: No cyanosis or pallor, warm and well perfused.  Respiratory: No respiratory distress, no accessory muscle use.  Psychiatric: Mood and affect appear normal.       Lab Results   Component Value Date    A1C 8.9 " 02/02/2024    A1C 5.7 03/11/2020         Results from last visit:  Office Visit on 02/02/2024   Component Date Value Ref Range Status    Hepatitis C Antibody 02/02/2024 Nonreactive  Nonreactive Final    A nonreactive screening test result does not exclude the possibility of exposure to or infection with HCV. Nonreactive screening test results in individuals with prior exposure to HCV may be due to antibody levels below the limit of detection of this assay or lack of reactivity to the HCV antigens used in this assay. Patients with recent HCV infections (<3 months from time of exposure) may have false-negative HCV antibody results due to the time needed for seroconversion (average of 8 to 9 weeks).    Sodium 02/02/2024 140  135 - 145 mmol/L Final    Reference intervals for this test were updated on 09/26/2023 to more accurately reflect our healthy population. There may be differences in the flagging of prior results with similar values performed with this method. Interpretation of those prior results can be made in the context of the updated reference intervals.     Potassium 02/02/2024 4.2  3.4 - 5.3 mmol/L Final    Chloride 02/02/2024 102  98 - 107 mmol/L Final    Carbon Dioxide (CO2) 02/02/2024 29  22 - 29 mmol/L Final    Anion Gap 02/02/2024 9  7 - 15 mmol/L Final    Urea Nitrogen 02/02/2024 15.1  6.0 - 20.0 mg/dL Final    Creatinine 02/02/2024 0.79  0.67 - 1.17 mg/dL Final    GFR Estimate 02/02/2024 >90  >60 mL/min/1.73m2 Final    Calcium 02/02/2024 9.1  8.6 - 10.0 mg/dL Final    Glucose 02/02/2024 161 (H)  70 - 99 mg/dL Final    Cholesterol 02/02/2024 155  <200 mg/dL Final    Triglycerides 02/02/2024 174 (H)  <150 mg/dL Final    Direct Measure HDL 02/02/2024 43  >=40 mg/dL Final    LDL Cholesterol Calculated 02/02/2024 77  <=100 mg/dL Final    Non HDL Cholesterol 02/02/2024 112  <130 mg/dL Final    Patient Fasting > 8hrs? 02/02/2024 Unknown   Final    Hemoglobin A1C 02/02/2024 8.9 (H)  0.0 - 5.6 % Final     Normal <5.7%   Prediabetes 5.7-6.4%    Diabetes 6.5% or higher     Note: Adopted from ADA consensus guidelines.    Creatinine Urine mg/dL 02/02/2024 119.0  mg/dL Final    The reference ranges have not been established in urine creatinine. The results should be integrated into the clinical context for interpretation.    Albumin Urine mg/L 02/02/2024 <12.0  mg/L Final    The reference ranges have not been established in urine albumin. The results should be integrated into the clinical context for interpretation.    Albumin Urine mg/g Cr 02/02/2024    Final    Unable to calculate, urine albumin and/or urine creatinine is outside detectable limits.  Microalbuminuria is defined as an albumin:creatinine ratio of 17 to 299 for males and 25 to 299 for females. A ratio of albumin:creatinine of 300 or higher is indicative of overt proteinuria.  Due to biologic variability, positive results should be confirmed by a second, first-morning random or 24-hour timed urine specimen. If there is discrepancy, a third specimen is recommended. When 2 out of 3 results are in the microalbuminuria range, this is evidence for incipient nephropathy and warrants increased efforts at glucose control, blood pressure control, and institution of therapy with an angiotensin-converting-enzyme (ACE) inhibitor (if the patient can tolerate it).         Radha Modi MD on 2/28/2024 at 2:51 PM    ----- Service Performed and Documented by Resident or Fellow ------            .

## 2024-02-28 NOTE — Clinical Note
New diagnosis of diabetes. Any way they can get more financial assistance for food? I can fill out special diet form. They are ok with phone rita

## 2024-03-20 DIAGNOSIS — E11.9 TYPE 2 DIABETES MELLITUS WITHOUT COMPLICATION, WITHOUT LONG-TERM CURRENT USE OF INSULIN (H): ICD-10-CM

## 2024-03-20 RX ORDER — METFORMIN HCL 500 MG
TABLET, EXTENDED RELEASE 24 HR ORAL
Qty: 30 TABLET | Refills: 0 | Status: SHIPPED | OUTPATIENT
Start: 2024-03-20

## 2024-03-20 RX ORDER — ROSUVASTATIN CALCIUM 10 MG/1
10 TABLET, COATED ORAL DAILY
Qty: 30 TABLET | Refills: 0 | Status: SHIPPED | OUTPATIENT
Start: 2024-03-20

## 2024-03-20 NOTE — TELEPHONE ENCOUNTER
"Request for medication refill:  metFORMIN (GLUCOPHAGE XR) 500 MG 24 hr tablet     rosuvastatin (CRESTOR) 10 MG tablet     Providers if patient needs an appointment and you are willing to give a one month supply please refill for one month and  send a letter/MyChart using \".SMILLIMITEDREFILL\" .smillimited and route chart to \"P [facility] \" (Giving one month refill in non controlled medications is strongly recommended before denial)    If refill has been denied, meaning absolutely no refills without visit, please complete the smart phrase \".smirxrefuse\" and route it to the \"P SMI MED REFILLS\"  pool to inform the patient and the pharmacy.    Sukumar Rodríguez, CMA      "

## 2024-04-04 ENCOUNTER — TELEPHONE (OUTPATIENT)
Dept: FAMILY MEDICINE | Facility: CLINIC | Age: 44
End: 2024-04-04
Payer: MEDICAID

## 2024-04-04 NOTE — TELEPHONE ENCOUNTER
CC attempted to call patient to schedule follow up for DM per ITM with Dr. Modi. Was sent straight to voicemail, and asked patient to call back to CC's direct phone number.     Selvin Villafana  Care Coordinator- [facility]  [phone]

## 2024-05-25 DIAGNOSIS — G89.29 OTHER CHRONIC PAIN: ICD-10-CM

## 2024-05-25 DIAGNOSIS — G89.4 CHRONIC PAIN SYNDROME: ICD-10-CM

## 2024-05-25 DIAGNOSIS — M54.6 CHRONIC LEFT-SIDED THORACIC BACK PAIN: ICD-10-CM

## 2024-05-25 DIAGNOSIS — G89.29 CHRONIC LEFT-SIDED THORACIC BACK PAIN: ICD-10-CM

## 2024-05-26 RX ORDER — GABAPENTIN 400 MG/1
CAPSULE ORAL
Qty: 180 CAPSULE | Refills: 3 | Status: SHIPPED | OUTPATIENT
Start: 2024-05-26 | End: 2024-05-28

## 2024-05-28 ENCOUNTER — TELEPHONE (OUTPATIENT)
Dept: FAMILY MEDICINE | Facility: CLINIC | Age: 44
End: 2024-05-28
Payer: MEDICAID

## 2024-05-28 DIAGNOSIS — G89.29 CHRONIC LEFT-SIDED THORACIC BACK PAIN: ICD-10-CM

## 2024-05-28 DIAGNOSIS — G89.4 CHRONIC PAIN SYNDROME: ICD-10-CM

## 2024-05-28 DIAGNOSIS — G89.29 OTHER CHRONIC PAIN: ICD-10-CM

## 2024-05-28 DIAGNOSIS — M54.6 CHRONIC LEFT-SIDED THORACIC BACK PAIN: ICD-10-CM

## 2024-05-28 RX ORDER — GABAPENTIN 400 MG/1
800 CAPSULE ORAL 3 TIMES DAILY
Qty: 180 CAPSULE | Refills: 5 | Status: SHIPPED | OUTPATIENT
Start: 2024-05-28

## 2024-05-28 NOTE — TELEPHONE ENCOUNTER
Vaughn was seen today for medication question.    Diagnoses and all orders for this visit:    Other chronic pain  -     gabapentin (NEURONTIN) 400 MG capsule; Take 2 capsules (800 mg) by mouth 3 times daily    Chronic pain syndrome  -     gabapentin (NEURONTIN) 400 MG capsule; Take 2 capsules (800 mg) by mouth 3 times daily    Chronic left-sided thoracic back pain  -     gabapentin (NEURONTIN) 400 MG capsule; Take 2 capsules (800 mg) by mouth 3 times daily      Faculty signature for resident.    Harry Schrader, DO

## 2024-05-28 NOTE — TELEPHONE ENCOUNTER
"[facility] phone call message- medication clarification/question:    Full Medication Name: gabapentin (NEURONTIN) 400 MG capsule    Dose: TAKE 2 CAPSULES(800 MG) BY MOUTH THREE TIMES DAILY     Question: Patient states they cannot get rx above from pharmacy because they are being told the provider who signed the prescription is \"out of network\". Patient is on [place]. Not sure if a faculty provider needed to sign the script instead.      Pharmacy confirmed as    [id number] - [place], [place] - [address] AT [facility] [address]:Yes    OK to leave a message on voice mail? Yes    Primary language: English      needed? No    Call taken on May 28, 2024 at 3:55 PM by Dorota Linares   "

## 2024-08-08 DIAGNOSIS — E11.9 TYPE 2 DIABETES MELLITUS WITHOUT COMPLICATION, WITHOUT LONG-TERM CURRENT USE OF INSULIN (H): Primary | ICD-10-CM

## 2024-08-08 PROBLEM — E66.01 MORBID OBESITY (H): Status: RESOLVED | Noted: 2021-10-01 | Resolved: 2024-08-08

## 2024-08-09 ENCOUNTER — TELEPHONE (OUTPATIENT)
Dept: FAMILY MEDICINE | Facility: CLINIC | Age: 44
End: 2024-08-09
Payer: MEDICAID

## 2024-08-09 NOTE — TELEPHONE ENCOUNTER
CC reached out to patient to attempt to reschedule appointment that was missed 8/8 with Dr. Modi. PATM with direct number.     Selvin Villafana  Care Coordinator- [facility]  [phone]

## 2024-09-05 ENCOUNTER — PATIENT OUTREACH (OUTPATIENT)
Dept: FAMILY MEDICINE | Facility: CLINIC | Age: 44
End: 2024-09-05
Payer: MEDICAID

## 2024-09-05 NOTE — LETTER
9/5/2024         RE: Vaughn S Paulo  [address]  [place] [address]      Patient Quality Outreach    Patient is due for the following:   Asthma  -  ACT needed and Asthma follow-up visit  Depression  -  Depression follow-up visit  Physical Preventive Adult Physical    Next Steps:   Patient has upcoming appointment, these items will be addressed at that time.    Type of outreach:    Sent [employer] message.  {Subsequent attempt (Optional):772501}    Questions for provider review:    None     {Resources   Quality Outreach Report :941702}      Bo Farrell CMA  {Chart Routed (Optional):539951}         Radha Modi MD

## 2024-09-05 NOTE — LETTER
September 5, 2024    To  Vaughn Bates  [address]  [place] [address]    Your team at [facility] cares about your health. We have reviewed your chart and based on our findings; we are making the following recommendations to better manage your health.     You are in particular need of attention regarding the following:     Asthma Control Test     This screening tool helps us to assess how well your asthma is controlled.Good asthma control leads to fewer asthma symptoms and greater health. If your asthma is not in good control (score is 19 or less) or you have been to the ER or urgent care for your asthma, it is recommended you be seen by your provider for medication and lifestyle adjustments.      Please complete and return the attached Asthma Control Test respond below with you answers for each question: Adult ACT     This is valuable information that is requested by your Care Team.    Schedule an office visit with your PRIMARY CARE PHYSICIAN for mental health follow-up.  PREVENTATIVE VISIT: Physical    If you have already completed these items, please contact the clinic via phone or   MyChart so your care team can review and update your records. Thank you for   choosing [facility] for your healthcare needs. For any questions,   concerns, or to schedule an appointment please contact our clinic.    Healthy Regards,      Your [facility] Care Team         Radha Modi MD

## 2024-09-05 NOTE — TELEPHONE ENCOUNTER
Patient Quality Outreach    Patient is due for the following:   Asthma  -  ACT needed and Asthma follow-up visit  Depression  -  Depression follow-up visit  Physical Preventive Adult Physical    Next Steps:   Patient has upcoming appointment, these items will be addressed at that time.    Type of outreach:    Sent letter.      Questions for provider review:    None           Bo Farrell CMA

## 2024-11-26 DIAGNOSIS — G89.4 CHRONIC PAIN SYNDROME: ICD-10-CM

## 2024-11-26 DIAGNOSIS — G89.29 OTHER CHRONIC PAIN: ICD-10-CM

## 2024-11-26 DIAGNOSIS — G89.29 CHRONIC LEFT-SIDED THORACIC BACK PAIN: ICD-10-CM

## 2024-11-26 DIAGNOSIS — M54.6 CHRONIC LEFT-SIDED THORACIC BACK PAIN: ICD-10-CM

## 2024-11-26 NOTE — TELEPHONE ENCOUNTER
"RN queued up medication and pharmacy, sending to provider for review.    Last seen 2/2/24    Request for medication refill:    gabapentin (NEURONTIN) 400 MG capsule     Providers if patient needs an appointment and you are willing to give a one month supply please refill for one month and  send a letter/MyChart using \".SMILLIMITEDREFILL\" .smillimited and route chart to \"P SMI \" (Giving one month refill in non controlled medications is strongly recommended before denial)    If refill has been denied, meaning absolutely no refills without visit, please complete the smart phrase \".smirxrefuse\" and route it to the \"P SMI MED REFILLS\"  pool to inform the patient and the pharmacy.    Liza Hunter RN        "

## 2024-11-26 NOTE — TELEPHONE ENCOUNTER
[facility] phone call message- patient requesting a refill:    Full Medication Name: gabapentin (NEURONTIN) 400 MG capsule         Pharmacy confirmed as     [id number] - [place], [place] - [address] AT [facility] [address] [address]  Phone: [phone] Fax: [phone]    : Yes    Additional Comments: The patient is requesting a refill.     OK to leave a message on voice mail? Yes    Primary language: English      needed? No    Call taken on November 26, 2024 at 12:42 PM by Serena Pelaez

## 2024-11-27 RX ORDER — GABAPENTIN 400 MG/1
800 CAPSULE ORAL 3 TIMES DAILY
Qty: 180 CAPSULE | Refills: 5 | Status: SHIPPED | OUTPATIENT
Start: 2024-11-27

## 2024-12-18 ENCOUNTER — TELEPHONE (OUTPATIENT)
Dept: FAMILY MEDICINE | Facility: CLINIC | Age: 44
End: 2024-12-18

## 2024-12-18 ENCOUNTER — DOCUMENTATION ONLY (OUTPATIENT)
Dept: FAMILY MEDICINE | Facility: CLINIC | Age: 44
End: 2024-12-18

## 2024-12-18 ENCOUNTER — OFFICE VISIT (OUTPATIENT)
Dept: FAMILY MEDICINE | Facility: CLINIC | Age: 44
End: 2024-12-18
Payer: MEDICAID

## 2024-12-18 VITALS
SYSTOLIC BLOOD PRESSURE: 144 MMHG | HEIGHT: 71 IN | BODY MASS INDEX: 44.1 KG/M2 | HEART RATE: 98 BPM | RESPIRATION RATE: 20 BRPM | DIASTOLIC BLOOD PRESSURE: 92 MMHG | WEIGHT: 315 LBS | TEMPERATURE: 98.2 F | OXYGEN SATURATION: 95 %

## 2024-12-18 DIAGNOSIS — G89.29 CHRONIC LEFT-SIDED THORACIC BACK PAIN: ICD-10-CM

## 2024-12-18 DIAGNOSIS — E11.65 TYPE 2 DIABETES MELLITUS WITH HYPERGLYCEMIA, WITHOUT LONG-TERM CURRENT USE OF INSULIN (H): Primary | ICD-10-CM

## 2024-12-18 DIAGNOSIS — E11.9 TYPE 2 DIABETES MELLITUS WITHOUT COMPLICATION, WITHOUT LONG-TERM CURRENT USE OF INSULIN (H): Primary | ICD-10-CM

## 2024-12-18 DIAGNOSIS — I10 ESSENTIAL HYPERTENSION: ICD-10-CM

## 2024-12-18 DIAGNOSIS — R60.0 BILATERAL LOWER EXTREMITY EDEMA: ICD-10-CM

## 2024-12-18 DIAGNOSIS — M54.6 CHRONIC LEFT-SIDED THORACIC BACK PAIN: ICD-10-CM

## 2024-12-18 LAB
ALBUMIN UR-MCNC: 100 MG/DL
ANION GAP SERPL CALCULATED.3IONS-SCNC: 10 MMOL/L (ref 3–14)
APPEARANCE UR: CLEAR
B-OH-BUTYR SERPL-SCNC: <0.18 MMOL/L
BILIRUB UR QL STRIP: NEGATIVE
BUN SERPL-MCNC: 8 MG/DL (ref 7–30)
CALCIUM SERPL-MCNC: 9.7 MG/DL (ref 8.5–10.1)
CHLORIDE BLD-SCNC: 96 MMOL/L (ref 94–109)
CO2 SERPL-SCNC: 30 MMOL/L (ref 20–32)
COLOR UR AUTO: YELLOW
CREAT SERPL-MCNC: 0.7 MG/DL (ref 0.66–1.25)
CREAT UR-MCNC: 146 MG/DL
EGFRCR SERPLBLD CKD-EPI 2021: >90 ML/MIN/1.73M2
ERYTHROCYTE [DISTWIDTH] IN BLOOD BY AUTOMATED COUNT: 13.4 % (ref 10–15)
EST. AVERAGE GLUCOSE BLD GHB EST-MCNC: 384 MG/DL
GLUCOSE BLD-MCNC: 393 MG/DL (ref 70–99)
GLUCOSE UR STRIP-MCNC: >=1000 MG/DL
HBA1C MFR BLD: 15 % (ref 0–5.6)
HCT VFR BLD AUTO: 48.9 % (ref 40–53)
HGB BLD-MCNC: 15.7 G/DL (ref 13.3–17.7)
HGB UR QL STRIP: NEGATIVE
HOLD SPECIMEN: NORMAL
KETONES UR STRIP-MCNC: NEGATIVE MG/DL
KETONES UR STRIP-MCNC: NEGATIVE MG/DL
LEUKOCYTE ESTERASE UR QL STRIP: NEGATIVE
MCH RBC QN AUTO: 26 PG (ref 26.5–33)
MCHC RBC AUTO-ENTMCNC: 32.1 G/DL (ref 31.5–36.5)
MCV RBC AUTO: 81 FL (ref 78–100)
MICROALBUMIN UR-MCNC: 161 MG/L
MICROALBUMIN/CREAT UR: 110.27 MG/G CR (ref 0–17)
NITRATE UR QL: NEGATIVE
PH UR STRIP: 6 [PH] (ref 5–8)
PLATELET # BLD AUTO: 286 10E3/UL (ref 150–450)
POTASSIUM BLD-SCNC: 4 MMOL/L (ref 3.4–5.3)
RBC # BLD AUTO: 6.05 10E6/UL (ref 4.4–5.9)
RBC #/AREA URNS AUTO: NORMAL /HPF
SODIUM SERPL-SCNC: 136 MMOL/L (ref 135–145)
SP GR UR STRIP: 1.02 (ref 1–1.03)
UROBILINOGEN UR STRIP-ACNC: 0.2 E.U./DL
WBC # BLD AUTO: 9.7 10E3/UL (ref 4–11)
WBC #/AREA URNS AUTO: NORMAL /HPF

## 2024-12-18 RX ORDER — LANCETS
EACH MISCELLANEOUS
Qty: 100 EACH | Refills: 6 | Status: SHIPPED | OUTPATIENT
Start: 2024-12-18

## 2024-12-18 RX ORDER — ROSUVASTATIN CALCIUM 10 MG/1
10 TABLET, COATED ORAL DAILY
Qty: 90 TABLET | Refills: 1 | Status: SHIPPED | OUTPATIENT
Start: 2024-12-18

## 2024-12-18 RX ORDER — GABAPENTIN 400 MG/1
800 CAPSULE ORAL 3 TIMES DAILY
Qty: 180 CAPSULE | Refills: 5 | Status: CANCELLED | OUTPATIENT
Start: 2024-12-18

## 2024-12-18 RX ORDER — METFORMIN HYDROCHLORIDE 500 MG/1
TABLET, EXTENDED RELEASE ORAL
Qty: 210 TABLET | Refills: 0 | Status: SHIPPED | OUTPATIENT
Start: 2024-12-18 | End: 2025-02-16

## 2024-12-18 ASSESSMENT — ASTHMA QUESTIONNAIRES
QUESTION_4 LAST FOUR WEEKS HOW OFTEN HAVE YOU USED YOUR RESCUE INHALER OR NEBULIZER MEDICATION (SUCH AS ALBUTEROL): ONCE A WEEK OR LESS
ACT_TOTALSCORE: 23
QUESTION_5 LAST FOUR WEEKS HOW WOULD YOU RATE YOUR ASTHMA CONTROL: WELL CONTROLLED
ACT_TOTALSCORE: 23
QUESTION_3 LAST FOUR WEEKS HOW OFTEN DID YOUR ASTHMA SYMPTOMS (WHEEZING, COUGHING, SHORTNESS OF BREATH, CHEST TIGHTNESS OR PAIN) WAKE YOU UP AT NIGHT OR EARLIER THAN USUAL IN THE MORNING: NOT AT ALL
QUESTION_1 LAST FOUR WEEKS HOW MUCH OF THE TIME DID YOUR ASTHMA KEEP YOU FROM GETTING AS MUCH DONE AT WORK, SCHOOL OR AT HOME: NONE OF THE TIME
QUESTION_2 LAST FOUR WEEKS HOW OFTEN HAVE YOU HAD SHORTNESS OF BREATH: NOT AT ALL

## 2024-12-18 NOTE — TELEPHONE ENCOUNTER
JITENDRA mike's via Dr BRANNON. Patient will call to set up in 2 weeks from today.    Dinora Palma MA

## 2024-12-18 NOTE — PROGRESS NOTES
Assessment & Plan     Type 2 diabetes mellitus with hyperglycemia, without long-term current use of insulin (H)  Pt only recently diagnosed w/T2DM earlier this year (Feb) but was subsequently lost to follow-up. Only took metformin for about a month; no other glycemic lowering agents. On presentation today, has symptoms of hyperglycemia (polyuria, polydipsia, lower extremity edema, light headedness, fatigue) and initial A1c was profoundly elevated at 15.0 - however, patient did not have symptoms of ketosis or HHS (no n/v, AMS, etc.). The following labs were checked STAT in clinic and were overall reassuring with no ketosis or electrolyte derangements - given the reassuring lab values, I do not feel that referral to the emergency department is necessary at the present time.  - Hemoglobin A1c  - CBC with platelets  - Basic metabolic panel  - Ketones urine  - Ketone Beta-Hydroxybutyrate Quantitative,Rapid  - UA Macroscopic with reflex to Microscopic and Culture  - Albumin Random Urine Quantitative with Creat Ratio    Discussed my strong recommendation that he RTC in about two weeks for additional medication titration and checking in on his blood sugars. Would recommend a pharmacy visit at that time, along with ensuring that he has titrated up to max dose metformin. Will likely need increased Jardiance to 25mg daily and, very likely, will require insulin initiation.  - metFORMIN (GLUCOPHAGE XR) 500 MG 24 hr tablet; Take 1 tablet (500 mg) by mouth 2 times daily (with meals) for 15 days, THEN 2 tablets (1,000 mg) 2 times daily (with meals).  - rosuvastatin (CRESTOR) 10 MG tablet; Take 1 tablet (10 mg) by mouth daily.  - empagliflozin (JARDIANCE) 10 MG TABS tablet; Take 1 tablet (10 mg) by mouth daily.  -     blood glucose monitoring (NO BRAND SPECIFIED) meter device kit; Use to test blood sugar 2 times daily or as directed. Preferred blood glucose meter OR supplies to accompany: Blood Glucose Monitor Brands: per  insurance.  -     blood glucose (NO BRAND SPECIFIED) test strip; Use to test blood sugar 2 times daily or as directed. To accompany: Blood Glucose Monitor Brands: per insurance.  -     thin (NO BRAND SPECIFIED) lancets; Use with lanceting device. To accompany: Blood Glucose Monitor Brands: per insurance.  -     Adult Diabetes Education  Referral; Future    Chronic left-sided thoracic back pain  Long-standing chronic pain treated with gabapentin, occasional naproxen. Per patient report no active concern for naproxen overuse.    Essential hypertension  Bilateral lower extremity edema  Not on any antihypertensives currently. BP only mildly elevated today, but with proteinuria above likely representative of diabetic nephropathy would benefit from ACE/ARB initiation. Given urgency or reinitiation of multiple different medications as above and overwhelming nature of the visit, decision to discuss anti-HTN therapy was deferred, but would strongly recommend initiation of valsartan (or similar) and consider referral for echocardiogram; bilateral LE edema concerning for occult heart failure.    Return in about 2 weeks (around 1/1/2025).    Jennifer Mendes is a 44 year old, presenting for the following health issues:  RECHECK (Check in) and Refill Request        12/18/2024     3:34 PM   Additional Questions   Roomed by ohn   Accompanied by self     HPI     DM - Had a diabetes class when he was younger (maybe 10 or 15 years ago) and didn't have it at that time. Was diagnosed with diabetes earlier this year. [place] to [place] for Methadone but has struggled finding a methadone clinic down here after moving. Goes up [facility], working to move to once a week but is nervous about doing that at the moment.    Last checked blood sugars in the summer time when he was feeling really ill; sugars were over 500 at that time and he treated it with a lot of water.    Over the last few months has been having  "some swelling in lower legs, a bit of occasional light headedness, has been peeing a lot, no noticeable increase in thirst, slightly darker urine.    No chest pain, no dyspnea, no new or worsening cough (has a baseline cough 2/2 cigarette smoke), no nausea, no dysuria, no hematuria, no vision changes     Hasn't been taking his BP medication, metformin, crestor, or hydrochlorothiazide  Is taking Gabapentin, methadone, and naproxen (takes naproxen once or twice a week at most)    Has a hx of pleural effusion requiring chest tube placement for over 1.5 weeks, maybe about 8 years ago.  Does an EKG once a year for the methadone clinic, takes 84mg methadone daily, has been on it for about 7 years; was previously on suboxone.    Review of Systems  Constitutional, eye, ENT, skin, respiratory, cardiac, and GI are normal except as otherwise noted.    Objective    BP (!) 144/92   Pulse 98   Temp 98.2  F (36.8  C) (Oral)   Resp 20   Ht 1.803 m (5' 11\")   Wt (!) 158.3 kg (349 lb)   SpO2 95%   BMI 48.68 kg/m    Physical Exam   Vitals reviewed.  Constitutional: awake, alert, cooperative, in NAD  Eyes: Sclera without jaundice or injection, PERRLA, EOM intact   HENT: NCAT without lesions, oral cavity with MMM, poor dentition  Respiratory: Lungs CTAB without crackles, wheezing, rales, or increased work of breathing  Cardiovascular: RRR without murmurs or extra sounds, radial pulses 2+ bilaterally  Abdomen: Soft, non-distended, non-tender, positive bowel sounds  Skin: Warm & dry, without lesions, erythema, rashes, or ecchymosis  Musculoskeletal: No extremity redness, or bony tenderness; bilateral trace pitting edema to mid-shin  Neurologic: A&Ox4, without focal deficit, cranial nerves II-XII grossly intact  Psychiatric: Alert & calm with appropriate affect, mood, insight, and thought processes    The following results were reviewed:   Results for orders placed or performed in visit on 12/18/24   Hemoglobin A1c     Status: " Abnormal   Result Value Ref Range    Estimated Average Glucose 384 (H) <117 mg/dL    Hemoglobin A1C 15.0 (H) 0.0 - 5.6 %    Narrative    Results confirmed by repeat test.     Extra Tube     Status: None    Narrative    The following orders were created for panel order Extra Tube.  Procedure                               Abnormality         Status                     ---------                               -----------         ------                     Extra Green Top (Lithium...[[id number]]                      Final result                 Please view results for these tests on the individual orders.   Extra Green Top (Lithium Heparin) Tube     Status: None   Result Value Ref Range    Hold Specimen [facility]    CBC with platelets     Status: Abnormal   Result Value Ref Range    WBC Count 9.7 4.0 - 11.0 10e3/uL    RBC Count 6.05 (H) 4.40 - 5.90 10e6/uL    Hemoglobin 15.7 13.3 - 17.7 g/dL    Hematocrit 48.9 40.0 - 53.0 %    MCV 81 78 - 100 fL    MCH 26.0 (L) 26.5 - 33.0 pg    MCHC 32.1 31.5 - 36.5 g/dL    RDW 13.4 10.0 - 15.0 %    Platelet Count 286 150 - 450 10e3/uL   Basic metabolic panel     Status: Abnormal   Result Value Ref Range    Sodium 136 135 - 145 mmol/L    Potassium 4.0 3.4 - 5.3 mmol/L    Chloride 96 94 - 109 mmol/L    Carbon Dioxide (CO2) 30 20 - 32 mmol/L    Anion Gap 10 3 - 14 mmol/L    Urea Nitrogen 8 7 - 30 mg/dL    Creatinine 0.70 0.66 - 1.25 mg/dL    GFR Estimate >90 >60 mL/min/1.73m2    Calcium 9.7 8.5 - 10.1 mg/dL    Glucose 393 (H) 70 - 99 mg/dL   Ketones urine     Status: Normal   Result Value Ref Range    Ketones Urine Negative Negative mg/dL   UA Macroscopic with reflex to Microscopic and Culture     Status: Abnormal    Specimen: Urine, Midstream   Result Value Ref Range    Color Urine Yellow Colorless, Straw, Light Yellow, Yellow    Appearance Urine Clear Clear    Glucose Urine >=1000 (A) Negative mg/dL    Bilirubin Urine Negative Negative    Ketones Urine Negative Negative mg/dL    Specific  Gravity Urine 1.020 1.005 - 1.030    Blood Urine Negative Negative    pH Urine 6.0 5.0 - 8.0    Protein Albumin Urine 100 (A) Negative mg/dL    Urobilinogen Urine 0.2 0.2, 1.0 E.U./dL    Nitrite Urine Negative Negative    Leukocyte Esterase Urine Negative Negative   UA Microscopic with Reflex to Culture     Status: Normal   Result Value Ref Range    RBC Urine None Seen 0-2 /HPF /HPF    WBC Urine None Seen 0-5 /HPF /HPF    Narrative    Urine Culture not indicated       Signed Electronically by: Gilmer Kulkarni DO

## 2025-01-10 ENCOUNTER — TELEPHONE (OUTPATIENT)
Dept: FAMILY MEDICINE | Facility: CLINIC | Age: 45
End: 2025-01-10
Payer: MEDICAID

## 2025-01-10 NOTE — TELEPHONE ENCOUNTER
Patient missed appointment with Dr. Modi on 1/9/25. Attempted to reach patient to get rescheduled per Dr. Modi.    Unable to leave message, patient picked up the phone and hung up    Olivia Trujillo RN

## 2025-01-10 NOTE — LETTER
1/10/2025         RE: Vaughn Bates  [address]  [place] [address]          Hello,    We have tried to reach you by phone but have been unsuccessful.    Dr. Modi asked that we reach out as you missed your appointment on 1/9/2025. They would like for you to reschedule a visit.    Please call [phone] to schedule.    Thank you,      Olivia Trujillo RN

## 2025-01-13 NOTE — TELEPHONE ENCOUNTER
2nd attempt to reach patient, phone was picked up and then hung up, no way to leave a message.    Letter sent.    Olivia Trujillo RN

## 2025-02-10 DIAGNOSIS — E11.65 TYPE 2 DIABETES MELLITUS WITH HYPERGLYCEMIA, WITHOUT LONG-TERM CURRENT USE OF INSULIN (H): ICD-10-CM

## 2025-02-10 RX ORDER — METFORMIN HYDROCHLORIDE 500 MG/1
TABLET, EXTENDED RELEASE ORAL
Qty: 210 TABLET | Refills: 0 | Status: SHIPPED | OUTPATIENT
Start: 2025-02-10

## 2025-02-10 NOTE — TELEPHONE ENCOUNTER
"Request for medication refill: metFORMIN (GLUCOPHAGE XR) 500 MG 24 hr tablet     Providers if patient needs an appointment and you are willing to give a one month supply please refill for one month and  send a letter/MyChart using \".SMILLIMITEDREFILL\" .smillimited and route chart to \"P [facility] \" (Giving one month refill in non controlled medications is strongly recommended before denial)    If refill has been denied, meaning absolutely no refills without visit, please complete the smart phrase \".smirxrefuse\" and route it to the \"P [facility] MED REFILLS\"  pool to inform the patient and the pharmacy.    Bo Farrell, CMA    "

## 2025-05-28 DIAGNOSIS — G89.4 CHRONIC PAIN SYNDROME: ICD-10-CM

## 2025-05-28 DIAGNOSIS — G89.29 OTHER CHRONIC PAIN: ICD-10-CM

## 2025-05-28 DIAGNOSIS — M54.6 CHRONIC LEFT-SIDED THORACIC BACK PAIN: ICD-10-CM

## 2025-05-28 DIAGNOSIS — G89.29 CHRONIC LEFT-SIDED THORACIC BACK PAIN: ICD-10-CM

## 2025-05-28 RX ORDER — GABAPENTIN 400 MG/1
800 CAPSULE ORAL 3 TIMES DAILY
Qty: 180 CAPSULE | Refills: 5 | Status: SHIPPED | OUTPATIENT
Start: 2025-05-28

## 2025-05-28 NOTE — TELEPHONE ENCOUNTER
[facility] phone call message- patient requesting a refill:    Full Medication Name: gabapentin (NEURONTIN) 400 MG capsule     Pharmacy confirmed as   [id number] - [place], [place] - [address] AT [facility] [address] [address]  Phone: [phone] Fax: [phone]: Yes    Additional Comments: requests urgently, notified pt of 3 business day policy     OK to leave a message on voice mail? Yes    Primary language: English      needed? No    Call taken on May 28, 2025 at 9:25 AM by Tommy Arevalo

## 2025-05-28 NOTE — TELEPHONE ENCOUNTER
"Request for medication refill:    Medication Name: gabapentin (NEURONTIN) 400 MG capsule     Providers if patient needs an appointment and you are willing to give a one month supply please refill for one month and  send a MyChart using \".SMILLIMITEDREFILL\" .Or route chart to \"P [facility] \" . To call patient and inform of limited refill and providers request to make an appointment. (Giving one month refill in non controlled medications is strongly recommended before denial)    If refill has been denied, meaning absolutely no refills without visit, please complete the smart phrase \".SMIRXREFUSE\" and route it to the \"P [facility] MED REFILLS\"  pool to inform the patient and the pharmacy.    Jovi Tony MA      "

## 2025-07-07 ENCOUNTER — HOSPITAL ENCOUNTER (OUTPATIENT)
Dept: RADIOLOGY | Facility: HOSPITAL | Age: 45
Discharge: HOME | End: 2025-07-07
Payer: COMMERCIAL

## 2025-07-07 ENCOUNTER — OFFICE VISIT (OUTPATIENT)
Dept: ORTHOPEDICS | Facility: CLINIC | Age: 45
End: 2025-07-07
Payer: COMMERCIAL

## 2025-07-07 VITALS — HEIGHT: 75 IN | BODY MASS INDEX: 27.35 KG/M2 | WEIGHT: 220 LBS

## 2025-07-07 DIAGNOSIS — M25.561 PAIN IN BOTH KNEES, UNSPECIFIED CHRONICITY: Primary | ICD-10-CM

## 2025-07-07 DIAGNOSIS — M25.562 PAIN IN BOTH KNEES, UNSPECIFIED CHRONICITY: Primary | ICD-10-CM

## 2025-07-07 DIAGNOSIS — M25.561 PAIN IN BOTH KNEES, UNSPECIFIED CHRONICITY: ICD-10-CM

## 2025-07-07 DIAGNOSIS — M25.562 PAIN IN BOTH KNEES, UNSPECIFIED CHRONICITY: ICD-10-CM

## 2025-07-07 PROCEDURE — 3008F BODY MASS INDEX DOCD: CPT | Mod: S$GLB,,,

## 2025-07-07 PROCEDURE — 99203 OFFICE O/P NEW LOW 30 MIN: CPT | Mod: 25,S$GLB,,

## 2025-07-07 PROCEDURE — 73562 X-RAY EXAM OF KNEE 3: CPT | Mod: 26,,, | Performed by: RADIOLOGY

## 2025-07-07 PROCEDURE — 20610 DRAIN/INJ JOINT/BURSA W/O US: CPT | Mod: BC50,50,S$GLB,

## 2025-07-07 PROCEDURE — 73562 X-RAY EXAM OF KNEE 3: CPT | Mod: TC,50

## 2025-07-07 PROCEDURE — 99999 PR PBB SHADOW E&M-NEW PATIENT-LVL III: CPT | Mod: PBBFAC,,,

## 2025-07-07 PROCEDURE — 1159F MED LIST DOCD IN RCRD: CPT | Mod: S$GLB,,,

## 2025-07-07 PROCEDURE — 20610 DRAIN/INJ JOINT/BURSA W/O US: CPT | Mod: S$GLB,,,

## 2025-07-07 RX ADMIN — TRIAMCINOLONE ACETONIDE 40 MG: 40 INJECTION, SUSPENSION INTRA-ARTICULAR; INTRAMUSCULAR at 02:07

## 2025-07-07 RX ADMIN — BUPIVACAINE HYDROCHLORIDE 1 ML: 2.5 INJECTION, SOLUTION EPIDURAL; INFILTRATION; INTRACAUDAL; PERINEURAL at 02:07

## 2025-07-08 RX ORDER — BUPIVACAINE HYDROCHLORIDE 2.5 MG/ML
1 INJECTION, SOLUTION EPIDURAL; INFILTRATION; INTRACAUDAL; PERINEURAL
Status: DISCONTINUED | OUTPATIENT
Start: 2025-07-07 | End: 2025-07-08 | Stop reason: HOSPADM

## 2025-07-08 RX ORDER — TRIAMCINOLONE ACETONIDE 40 MG/ML
40 INJECTION, SUSPENSION INTRA-ARTICULAR; INTRAMUSCULAR
Status: DISCONTINUED | OUTPATIENT
Start: 2025-07-07 | End: 2025-07-08 | Stop reason: HOSPADM

## 2025-07-08 NOTE — PROGRESS NOTES
CC:   Chief Complaint   Patient presents with    Left Knee - Pain    Right Knee - Pain    Injections     Pt received bilateral knee steroid injection today      HPI     Injections     Additional comments: Pt received bilateral knee steroid injection today          Last edited by Aneudy Tolbert CMA on 7/7/2025  3:44 PM.        Christian Alaniz is a 44 y.o. male seen today for Pain of the Left Knee, Pain of the Right Knee, and Injections (Pt received bilateral knee steroid injection today)  Patient presents today with complaints of bilateral knee pain worsening over the past month.  He reports right knee pain worse than left knee pain.  He denies any specific injury to either knee.  He was recently seen by [facility] in [place] and told he had arthritis of both both knees.  He was given anti-inflammatories.  Patient is taking Mobic without much relief.  No other complaints today.        PAST MEDICAL HISTORY: History reviewed. No pertinent past medical history.       PAST SURGICAL HISTORY: History reviewed. No pertinent surgical history.       ALLERGIES: Review of patient's allergies indicates:  No Known Allergies     MEDICATIONS :  Current Medications[1]     SOCIAL HISTORY: Social History[2]     FAMILY HISTORY: No family history on file.       PHYSICAL EXAM:      There were no vitals filed for this visit.  Body mass index is 27.5 kg/m².    GENERAL: Well-developed, well-nourished male . The patient is alert, oriented and cooperative.    HEENT:  Normocephalic, atraumatic.  Extraocular movements are intact bilaterally.     NECK:  Nontender with good range of motion.    LUNGS:  Clear to auscultation bilaterally.    HEART:  Regular rate and rhythm.     ABDOMEN:  Soft, non-tender, non-distended.      EXTREMITIES:  Bilateral knees tenderness to palpation along medial and lateral joint lines, range of motion from 0-120 degrees but not without pain in the right knee, there is crepitus with  movement, knees feel stable to varus and valgus stress testing, neurovascularly intact      RADIOGRAPHIC FINDINGS:   X-Ray Knee 3 View Bilateral  Result Date: 7/8/2025  EXAMINATION: XR KNEE 3 VIEW BILATERAL CLINICAL HISTORY: Pain in right knee TECHNIQUE: AP, lateral, and Merchant views of both knees were performed. COMPARISON: None FINDINGS: A fracture or other osseous abnormalities is not seen of the femurs, patella, tibia or fibulas.  There is narrowing of the intercondylar joint space on both sides which may be due to cartilage thinning.  Spur formation, sclerosis or bone erosion is not seen.  A fracture or joint effusion is not noted.     Possible early osteoarthritis both knees Electronically signed by: Kaushik Marquez MD Date:    07/08/2025 Time:    10:10       There are no active problems to display for this patient.    IMPRESSION AND PLAN:  Chronic bilateral knee pain.  Personally reviewed x-rays today showing mild tricompartmental degenerative changes, no acute fracture or dislocation.  Discussed all treatment options with the patient today.  Patient with minimal relief with Mobic.  Discussed injections.  Patient would like to try bilateral knee injections today, see procedural note for details.  Discussed with the patient that these can be repeated in 3-4 months if needed.  Otherwise follow up p.r.n..    No follow-ups on file.       Anila Fisher PA-C      (Subject to voice recognition error, transcription service not allowed)           [1] No current outpatient medications on file.  [2]   Social History  Socioeconomic History    Marital status:    Tobacco Use    Smoking status: Never    Smokeless tobacco: Never

## 2025-07-08 NOTE — PROCEDURES
Large Joint Aspiration/Injection: bilateral knee    Date/Time: 7/7/2025 2:40 PM    Performed by: Anila Fisher PA  Authorized by: Anila Fisher PA    Consent Done?:  Yes (Verbal)  Indications:  Arthritis and pain    Details:  Needle Size:  22 G  Approach:  Anterolateral  Location:  Knee  Laterality:  Bilateral  Site:  Bilateral knee  Medications (Right):  1 mL BUPivacaine (PF) 0.25% (2.5 mg/ml) 0.25 % (2.5 mg/mL); 40 mg triamcinolone acetonide 40 mg/mL  Medications (Left):  1 mL BUPivacaine (PF) 0.25% (2.5 mg/ml) 0.25 % (2.5 mg/mL); 40 mg triamcinolone acetonide 40 mg/mL  Patient tolerance:  Patient tolerated the procedure well with no immediate complications

## 2025-07-09 ENCOUNTER — TELEPHONE (OUTPATIENT)
Dept: ORTHOPEDICS | Facility: CLINIC | Age: 45
End: 2025-07-09
Payer: COMMERCIAL

## 2025-07-09 RX ORDER — MELOXICAM 15 MG/1
15 TABLET ORAL DAILY
Qty: 30 TABLET | Refills: 2 | Status: SHIPPED | OUTPATIENT
Start: 2025-07-09 | End: 2025-10-07

## 2025-07-09 NOTE — TELEPHONE ENCOUNTER
Prescription sent to pharmacy. Attempted to call patient, phone number not in service.    Copied from CRM #[id number]. Topic: Medications - New Medication Request  >> Jul 9, 2025 10:30 AM Lizeth wrote:  Brenda (wife) called in stating that her  saw Dr.Victoria Fisher on 07/07 and she said that she would send in a prescription for meloxicam to [id number] - [place], [place] - [id number] [facility] [address]  Phone: [phone] Fax: [phone]  Hours: Not open 24 hours    She is stating that the pharmacy has not received that prescription yet and is asking if  could please send it over. Please give them a call with any questions or concerns.

## 2025-07-15 ENCOUNTER — DOCUMENTATION ONLY (OUTPATIENT)
Dept: FAMILY MEDICINE | Facility: CLINIC | Age: 45
End: 2025-07-15
Payer: MEDICAID

## 2025-07-15 NOTE — PROGRESS NOTES
"When opening a documentation only encounter, be sure to enter in \"Chief Complaint\" Forms and in \" Comments\" Title of form, description if needed.    Vaughn is a 44 year old  male  Form received via: Fax  Form now resides in: Provider Ready    Bo Farrell CMA               "